# Patient Record
Sex: FEMALE | Race: WHITE | NOT HISPANIC OR LATINO
[De-identification: names, ages, dates, MRNs, and addresses within clinical notes are randomized per-mention and may not be internally consistent; named-entity substitution may affect disease eponyms.]

---

## 2017-01-27 ENCOUNTER — TRANSCRIPTION ENCOUNTER (OUTPATIENT)
Age: 57
End: 2017-01-27

## 2017-02-06 ENCOUNTER — APPOINTMENT (OUTPATIENT)
Dept: HEART AND VASCULAR | Facility: CLINIC | Age: 57
End: 2017-02-06

## 2017-03-28 ENCOUNTER — APPOINTMENT (OUTPATIENT)
Dept: OTOLARYNGOLOGY | Facility: CLINIC | Age: 57
End: 2017-03-28

## 2017-03-28 VITALS
OXYGEN SATURATION: 97 % | DIASTOLIC BLOOD PRESSURE: 86 MMHG | HEART RATE: 72 BPM | SYSTOLIC BLOOD PRESSURE: 152 MMHG | TEMPERATURE: 97.9 F

## 2017-03-28 VITALS — BODY MASS INDEX: 29.95 KG/M2 | WEIGHT: 180 LBS

## 2017-03-28 DIAGNOSIS — Z87.19 PERSONAL HISTORY OF OTHER DISEASES OF THE DIGESTIVE SYSTEM: ICD-10-CM

## 2017-08-10 ENCOUNTER — MESSAGE (OUTPATIENT)
Age: 57
End: 2017-08-10

## 2017-08-15 ENCOUNTER — APPOINTMENT (OUTPATIENT)
Dept: DERMATOLOGY | Facility: CLINIC | Age: 57
End: 2017-08-15
Payer: COMMERCIAL

## 2017-08-15 PROCEDURE — D0108R: CUSTOM

## 2017-08-15 PROCEDURE — D0088R: CUSTOM

## 2017-09-22 ENCOUNTER — MEDICATION RENEWAL (OUTPATIENT)
Age: 57
End: 2017-09-22

## 2018-01-04 ENCOUNTER — APPOINTMENT (OUTPATIENT)
Dept: HEART AND VASCULAR | Facility: CLINIC | Age: 58
End: 2018-01-04

## 2018-01-12 ENCOUNTER — APPOINTMENT (OUTPATIENT)
Dept: HEART AND VASCULAR | Facility: CLINIC | Age: 58
End: 2018-01-12
Payer: MEDICAID

## 2018-01-12 ENCOUNTER — LABORATORY RESULT (OUTPATIENT)
Age: 58
End: 2018-01-12

## 2018-01-12 VITALS
HEIGHT: 65 IN | SYSTOLIC BLOOD PRESSURE: 130 MMHG | WEIGHT: 162 LBS | TEMPERATURE: 97.9 F | RESPIRATION RATE: 14 BRPM | DIASTOLIC BLOOD PRESSURE: 86 MMHG | OXYGEN SATURATION: 98 % | HEART RATE: 97 BPM | BODY MASS INDEX: 26.99 KG/M2

## 2018-01-12 PROCEDURE — 93000 ELECTROCARDIOGRAM COMPLETE: CPT

## 2018-01-12 PROCEDURE — 36415 COLL VENOUS BLD VENIPUNCTURE: CPT

## 2018-01-12 PROCEDURE — 99396 PREV VISIT EST AGE 40-64: CPT | Mod: 25

## 2018-01-12 RX ORDER — FLUTICASONE PROPIONATE 50 UG/1
50 SPRAY, METERED NASAL
Qty: 1 | Refills: 2 | Status: DISCONTINUED | COMMUNITY
Start: 2017-03-28 | End: 2018-01-12

## 2018-01-16 LAB
ALBUMIN SERPL ELPH-MCNC: 4.5 G/DL
ALP BLD-CCNC: 59 U/L
ALT SERPL-CCNC: 12 U/L
ANION GAP SERPL CALC-SCNC: 17 MMOL/L
APPEARANCE: CLEAR
AST SERPL-CCNC: 14 U/L
BASOPHILS # BLD AUTO: 0.02 K/UL
BASOPHILS NFR BLD AUTO: 0.4 %
BILIRUB SERPL-MCNC: <0.2 MG/DL
BILIRUBIN URINE: NEGATIVE
BLOOD URINE: NEGATIVE
BUN SERPL-MCNC: 14 MG/DL
CALCIUM SERPL-MCNC: 9.5 MG/DL
CHLORIDE SERPL-SCNC: 102 MMOL/L
CHOLEST SERPL-MCNC: 230 MG/DL
CHOLEST/HDLC SERPL: 2.9 RATIO
CO2 SERPL-SCNC: 22 MMOL/L
COLOR: YELLOW
CREAT SERPL-MCNC: 0.78 MG/DL
CREAT SPEC-SCNC: 149 MG/DL
EOSINOPHIL # BLD AUTO: 0.11 K/UL
EOSINOPHIL NFR BLD AUTO: 1.9 %
GLUCOSE QUALITATIVE U: NEGATIVE MG/DL
GLUCOSE SERPL-MCNC: 97 MG/DL
HBA1C MFR BLD HPLC: 5.6 %
HCT VFR BLD CALC: 43.7 %
HDLC SERPL-MCNC: 78 MG/DL
HGB BLD-MCNC: 13.9 G/DL
IMM GRANULOCYTES NFR BLD AUTO: 0 %
KETONES URINE: NEGATIVE
LDLC SERPL CALC-MCNC: 119 MG/DL
LEUKOCYTE ESTERASE URINE: ABNORMAL
LYMPHOCYTES # BLD AUTO: 1.9 K/UL
LYMPHOCYTES NFR BLD AUTO: 33.3 %
MAN DIFF?: NORMAL
MCHC RBC-ENTMCNC: 30 PG
MCHC RBC-ENTMCNC: 31.8 GM/DL
MCV RBC AUTO: 94.4 FL
MICROALBUMIN 24H UR DL<=1MG/L-MCNC: 0.5 MG/DL
MICROALBUMIN/CREAT 24H UR-RTO: 3 MG/G
MONOCYTES # BLD AUTO: 0.26 K/UL
MONOCYTES NFR BLD AUTO: 4.6 %
NEUTROPHILS # BLD AUTO: 3.41 K/UL
NEUTROPHILS NFR BLD AUTO: 59.8 %
NITRITE URINE: NEGATIVE
PH URINE: 6
PLATELET # BLD AUTO: 245 K/UL
POTASSIUM SERPL-SCNC: 4.6 MMOL/L
PROT SERPL-MCNC: 7.3 G/DL
PROTEIN URINE: NEGATIVE MG/DL
RBC # BLD: 4.63 M/UL
RBC # FLD: 13.8 %
SODIUM SERPL-SCNC: 141 MMOL/L
SPECIFIC GRAVITY URINE: 1.02
TRIGL SERPL-MCNC: 164 MG/DL
TSH SERPL-ACNC: 0.52 UIU/ML
UROBILINOGEN URINE: NEGATIVE MG/DL
WBC # FLD AUTO: 5.7 K/UL

## 2018-02-08 ENCOUNTER — TRANSCRIPTION ENCOUNTER (OUTPATIENT)
Age: 58
End: 2018-02-08

## 2018-03-05 ENCOUNTER — MEDICATION RENEWAL (OUTPATIENT)
Age: 58
End: 2018-03-05

## 2018-07-13 ENCOUNTER — TRANSCRIPTION ENCOUNTER (OUTPATIENT)
Age: 58
End: 2018-07-13

## 2018-08-16 ENCOUNTER — OUTPATIENT (OUTPATIENT)
Dept: OUTPATIENT SERVICES | Facility: HOSPITAL | Age: 58
LOS: 1 days | End: 2018-08-16

## 2018-08-16 ENCOUNTER — APPOINTMENT (OUTPATIENT)
Dept: PLASTIC SURGERY | Facility: CLINIC | Age: 58
End: 2018-08-16

## 2018-08-16 VITALS
SYSTOLIC BLOOD PRESSURE: 132 MMHG | DIASTOLIC BLOOD PRESSURE: 78 MMHG | BODY MASS INDEX: 23.3 KG/M2 | HEART RATE: 85 BPM | RESPIRATION RATE: 14 BRPM | WEIGHT: 140 LBS | TEMPERATURE: 99.3 F

## 2018-08-16 DIAGNOSIS — Z78.9 OTHER SPECIFIED HEALTH STATUS: ICD-10-CM

## 2018-08-16 DIAGNOSIS — Z86.19 PERSONAL HISTORY OF OTHER INFECTIOUS AND PARASITIC DISEASES: ICD-10-CM

## 2018-08-17 DIAGNOSIS — Z01.89 ENCOUNTER FOR OTHER SPECIFIED SPECIAL EXAMINATIONS: ICD-10-CM

## 2018-11-01 ENCOUNTER — APPOINTMENT (OUTPATIENT)
Dept: PLASTIC SURGERY | Facility: CLINIC | Age: 58
End: 2018-11-01

## 2018-11-01 ENCOUNTER — OUTPATIENT (OUTPATIENT)
Dept: OUTPATIENT SERVICES | Facility: HOSPITAL | Age: 58
LOS: 1 days | End: 2018-11-01
Payer: COMMERCIAL

## 2018-11-01 ENCOUNTER — APPOINTMENT (OUTPATIENT)
Dept: ORTHOPEDIC SURGERY | Facility: CLINIC | Age: 58
End: 2018-11-01
Payer: MEDICAID

## 2018-11-01 VITALS
HEART RATE: 76 BPM | DIASTOLIC BLOOD PRESSURE: 80 MMHG | OXYGEN SATURATION: 98 % | WEIGHT: 140 LBS | HEIGHT: 65 IN | BODY MASS INDEX: 23.32 KG/M2 | SYSTOLIC BLOOD PRESSURE: 124 MMHG

## 2018-11-01 PROCEDURE — 73130 X-RAY EXAM OF HAND: CPT | Mod: 26,50

## 2018-11-01 PROCEDURE — 99213 OFFICE O/P EST LOW 20 MIN: CPT

## 2018-11-01 PROCEDURE — 73130 X-RAY EXAM OF HAND: CPT

## 2019-01-11 ENCOUNTER — LABORATORY RESULT (OUTPATIENT)
Age: 59
End: 2019-01-11

## 2019-01-11 ENCOUNTER — APPOINTMENT (OUTPATIENT)
Dept: HEART AND VASCULAR | Facility: CLINIC | Age: 59
End: 2019-01-11
Payer: MEDICAID

## 2019-01-11 VITALS
HEIGHT: 65 IN | RESPIRATION RATE: 14 BRPM | OXYGEN SATURATION: 97 % | DIASTOLIC BLOOD PRESSURE: 85 MMHG | HEART RATE: 80 BPM | TEMPERATURE: 97.7 F | WEIGHT: 145 LBS | BODY MASS INDEX: 24.16 KG/M2 | SYSTOLIC BLOOD PRESSURE: 120 MMHG

## 2019-01-11 DIAGNOSIS — Z01.419 ENCOUNTER FOR GYNECOLOGICAL EXAMINATION (GENERAL) (ROUTINE) W/OUT ABNORMAL FINDINGS: ICD-10-CM

## 2019-01-11 PROCEDURE — 93000 ELECTROCARDIOGRAM COMPLETE: CPT

## 2019-01-11 PROCEDURE — 36415 COLL VENOUS BLD VENIPUNCTURE: CPT

## 2019-01-11 PROCEDURE — 99214 OFFICE O/P EST MOD 30 MIN: CPT

## 2019-01-11 NOTE — PHYSICAL EXAM
[General Appearance - Well Developed] : well developed [Normal Appearance] : normal appearance [Well Groomed] : well groomed [General Appearance - Well Nourished] : well nourished [No Deformities] : no deformities [General Appearance - In No Acute Distress] : no acute distress [Normal Conjunctiva] : the conjunctiva exhibited no abnormalities [Eyelids - No Xanthelasma] : the eyelids demonstrated no xanthelasmas [Normal Oral Mucosa] : normal oral mucosa [No Oral Pallor] : no oral pallor [No Oral Cyanosis] : no oral cyanosis [Normal Jugular Venous A Waves Present] : normal jugular venous A waves present [Normal Jugular Venous V Waves Present] : normal jugular venous V waves present [No Jugular Venous Moreno A Waves] : no jugular venous moreno A waves [Respiration, Rhythm And Depth] : normal respiratory rhythm and effort [Exaggerated Use Of Accessory Muscles For Inspiration] : no accessory muscle use [Auscultation Breath Sounds / Voice Sounds] : lungs were clear to auscultation bilaterally [Heart Rate And Rhythm] : heart rate and rhythm were normal [Heart Sounds] : normal S1 and S2 [Murmurs] : no murmurs present [Abdomen Soft] : soft [Abdomen Tenderness] : non-tender [Abdomen Mass (___ Cm)] : no abdominal mass palpated [Abnormal Walk] : normal gait [Gait - Sufficient For Exercise Testing] : the gait was sufficient for exercise testing [Nail Clubbing] : no clubbing of the fingernails [Cyanosis, Localized] : no localized cyanosis [Petechial Hemorrhages (___cm)] : no petechial hemorrhages [] : no ischemic changes [FreeTextEntry1] : sm all vesicular 0.5 cm lesion on erythematous base on 5th digit of left hand [Oriented To Time, Place, And Person] : oriented to person, place, and time [Affect] : the affect was normal [Mood] : the mood was normal [No Anxiety] : not feeling anxious

## 2019-01-11 NOTE — HISTORY OF PRESENT ILLNESS
[FreeTextEntry1] : feels well\par lost wt via diet and exercise\par discussed cancer screening, vaccines and rfm\par no new comps

## 2019-01-11 NOTE — DISCUSSION/SUMMARY
[___ Month(s)] : [unfilled] month(s) [With Me] : with me [FreeTextEntry1] : already got fluvax\par stable exam

## 2019-01-14 LAB
ALBUMIN SERPL ELPH-MCNC: 4.4 G/DL
ALP BLD-CCNC: 49 U/L
ALT SERPL-CCNC: 8 U/L
ANION GAP SERPL CALC-SCNC: 11 MMOL/L
APPEARANCE: CLEAR
AST SERPL-CCNC: 14 U/L
BASOPHILS # BLD AUTO: 0.01 K/UL
BASOPHILS NFR BLD AUTO: 0.2 %
BILIRUB SERPL-MCNC: 0.3 MG/DL
BILIRUBIN URINE: NEGATIVE
BLOOD URINE: ABNORMAL
BUN SERPL-MCNC: 14 MG/DL
CALCIUM SERPL-MCNC: 9.1 MG/DL
CHLORIDE SERPL-SCNC: 105 MMOL/L
CHOLEST SERPL-MCNC: 226 MG/DL
CHOLEST/HDLC SERPL: 2.2 RATIO
CO2 SERPL-SCNC: 23 MMOL/L
COLOR: YELLOW
CREAT SERPL-MCNC: 0.77 MG/DL
CREAT SPEC-SCNC: 40 MG/DL
EOSINOPHIL # BLD AUTO: 0.09 K/UL
EOSINOPHIL NFR BLD AUTO: 1.7 %
GLUCOSE QUALITATIVE U: NEGATIVE MG/DL
GLUCOSE SERPL-MCNC: 101 MG/DL
HBA1C MFR BLD HPLC: 5.4 %
HCT VFR BLD CALC: 41.5 %
HDLC SERPL-MCNC: 104 MG/DL
HGB BLD-MCNC: 13.6 G/DL
IMM GRANULOCYTES NFR BLD AUTO: 0.2 %
KETONES URINE: NEGATIVE
LDLC SERPL CALC-MCNC: 104 MG/DL
LEUKOCYTE ESTERASE URINE: ABNORMAL
LYMPHOCYTES # BLD AUTO: 2.07 K/UL
LYMPHOCYTES NFR BLD AUTO: 40.1 %
MAN DIFF?: NORMAL
MCHC RBC-ENTMCNC: 30.1 PG
MCHC RBC-ENTMCNC: 32.8 GM/DL
MCV RBC AUTO: 91.8 FL
MICROALBUMIN 24H UR DL<=1MG/L-MCNC: <1.2 MG/DL
MICROALBUMIN/CREAT 24H UR-RTO: NORMAL
MONOCYTES # BLD AUTO: 0.29 K/UL
MONOCYTES NFR BLD AUTO: 5.6 %
NEUTROPHILS # BLD AUTO: 2.69 K/UL
NEUTROPHILS NFR BLD AUTO: 52.2 %
NITRITE URINE: NEGATIVE
PH URINE: 6.5
PLATELET # BLD AUTO: 211 K/UL
POTASSIUM SERPL-SCNC: 4.7 MMOL/L
PROT SERPL-MCNC: 6.8 G/DL
PROTEIN URINE: NEGATIVE MG/DL
RBC # BLD: 4.52 M/UL
RBC # FLD: 13.6 %
SODIUM SERPL-SCNC: 139 MMOL/L
SPECIFIC GRAVITY URINE: 1.01
TRIGL SERPL-MCNC: 89 MG/DL
TSH SERPL-ACNC: 0.81 UIU/ML
UROBILINOGEN URINE: NEGATIVE MG/DL
WBC # FLD AUTO: 5.16 K/UL

## 2019-04-02 ENCOUNTER — APPOINTMENT (OUTPATIENT)
Dept: HEART AND VASCULAR | Facility: CLINIC | Age: 59
End: 2019-04-02
Payer: MEDICAID

## 2019-04-02 VITALS
OXYGEN SATURATION: 98 % | DIASTOLIC BLOOD PRESSURE: 80 MMHG | HEART RATE: 70 BPM | SYSTOLIC BLOOD PRESSURE: 140 MMHG | WEIGHT: 145 LBS | HEIGHT: 65 IN | BODY MASS INDEX: 24.16 KG/M2 | RESPIRATION RATE: 14 BRPM

## 2019-04-02 PROCEDURE — 99214 OFFICE O/P EST MOD 30 MIN: CPT

## 2019-04-02 NOTE — PHYSICAL EXAM
[General Appearance - Well Developed] : well developed [Normal Appearance] : normal appearance [Well Groomed] : well groomed [General Appearance - Well Nourished] : well nourished [No Deformities] : no deformities [General Appearance - In No Acute Distress] : no acute distress [Normal Conjunctiva] : the conjunctiva exhibited no abnormalities [Eyelids - No Xanthelasma] : the eyelids demonstrated no xanthelasmas [Normal Oral Mucosa] : normal oral mucosa [No Oral Pallor] : no oral pallor [No Oral Cyanosis] : no oral cyanosis [Normal Jugular Venous A Waves Present] : normal jugular venous A waves present [Normal Jugular Venous V Waves Present] : normal jugular venous V waves present [No Jugular Venous Moreno A Waves] : no jugular venous moreno A waves [Respiration, Rhythm And Depth] : normal respiratory rhythm and effort [Exaggerated Use Of Accessory Muscles For Inspiration] : no accessory muscle use [Auscultation Breath Sounds / Voice Sounds] : lungs were clear to auscultation bilaterally [Heart Rate And Rhythm] : heart rate and rhythm were normal [Heart Sounds] : normal S1 and S2 [Murmurs] : no murmurs present [Abdomen Soft] : soft [Abdomen Tenderness] : non-tender [Abdomen Mass (___ Cm)] : no abdominal mass palpated [Abnormal Walk] : normal gait [Gait - Sufficient For Exercise Testing] : the gait was sufficient for exercise testing [Nail Clubbing] : no clubbing of the fingernails [Cyanosis, Localized] : no localized cyanosis [Petechial Hemorrhages (___cm)] : no petechial hemorrhages [] : no ischemic changes [Oriented To Time, Place, And Person] : oriented to person, place, and time [Affect] : the affect was normal [Mood] : the mood was normal [No Anxiety] : not feeling anxious [FreeTextEntry1] : sm all vesicular 0.5 cm lesion on erythematous base on 5th digit of left hand

## 2019-04-02 NOTE — DISCUSSION/SUMMARY
[FreeTextEntry1] : BPV- referred ENT\par so treatment needed at this point as sx are resolving and are mild

## 2019-04-02 NOTE — HISTORY OF PRESENT ILLNESS
[FreeTextEntry1] : vertigo\par positional, mild for the last week\par no focal neuro sx or HA\par no ear sx or recent uri\par improving but not resolved

## 2019-04-03 ENCOUNTER — TRANSCRIPTION ENCOUNTER (OUTPATIENT)
Age: 59
End: 2019-04-03

## 2019-10-21 ENCOUNTER — APPOINTMENT (OUTPATIENT)
Dept: DERMATOLOGY | Facility: CLINIC | Age: 59
End: 2019-10-21
Payer: MEDICAID

## 2019-10-21 VITALS — SYSTOLIC BLOOD PRESSURE: 135 MMHG | DIASTOLIC BLOOD PRESSURE: 73 MMHG

## 2019-10-21 PROCEDURE — 99213 OFFICE O/P EST LOW 20 MIN: CPT

## 2019-10-21 PROCEDURE — D0051: CPT

## 2020-01-14 ENCOUNTER — APPOINTMENT (OUTPATIENT)
Dept: OPHTHALMOLOGY | Facility: CLINIC | Age: 60
End: 2020-01-14
Payer: MEDICAID

## 2020-01-14 ENCOUNTER — NON-APPOINTMENT (OUTPATIENT)
Age: 60
End: 2020-01-14

## 2020-01-14 PROCEDURE — 92134 CPTRZ OPH DX IMG PST SGM RTA: CPT

## 2020-01-14 PROCEDURE — 92004 COMPRE OPH EXAM NEW PT 1/>: CPT

## 2020-02-26 ENCOUNTER — TRANSCRIPTION ENCOUNTER (OUTPATIENT)
Age: 60
End: 2020-02-26

## 2020-04-14 ENCOUNTER — APPOINTMENT (OUTPATIENT)
Dept: HEART AND VASCULAR | Facility: CLINIC | Age: 60
End: 2020-04-14
Payer: MEDICAID

## 2020-04-14 PROCEDURE — 99442: CPT

## 2020-05-06 ENCOUNTER — NON-APPOINTMENT (OUTPATIENT)
Age: 60
End: 2020-05-06

## 2020-05-06 ENCOUNTER — APPOINTMENT (OUTPATIENT)
Dept: HEART AND VASCULAR | Facility: CLINIC | Age: 60
End: 2020-05-06
Payer: MEDICAID

## 2020-05-06 VITALS
DIASTOLIC BLOOD PRESSURE: 110 MMHG | HEIGHT: 65 IN | OXYGEN SATURATION: 97 % | HEART RATE: 89 BPM | SYSTOLIC BLOOD PRESSURE: 179 MMHG | WEIGHT: 182 LBS | BODY MASS INDEX: 30.32 KG/M2

## 2020-05-06 VITALS — SYSTOLIC BLOOD PRESSURE: 175 MMHG | DIASTOLIC BLOOD PRESSURE: 94 MMHG

## 2020-05-06 DIAGNOSIS — R20.0 ANESTHESIA OF SKIN: ICD-10-CM

## 2020-05-06 DIAGNOSIS — L57.8 OTHER SKIN CHANGES DUE TO CHRONIC EXPOSURE TO NONIONIZING RADIATION: ICD-10-CM

## 2020-05-06 DIAGNOSIS — Z92.89 PERSONAL HISTORY OF OTHER MEDICAL TREATMENT: ICD-10-CM

## 2020-05-06 DIAGNOSIS — R04.0 EPISTAXIS: ICD-10-CM

## 2020-05-06 DIAGNOSIS — R03.0 ELEVATED BLOOD-PRESSURE READING, W/OUT DIAGNOSIS OF HYPERTENSION: ICD-10-CM

## 2020-05-06 DIAGNOSIS — E66.9 OBESITY, UNSPECIFIED: ICD-10-CM

## 2020-05-06 DIAGNOSIS — Z01.818 ENCOUNTER FOR OTHER PREPROCEDURAL EXAMINATION: ICD-10-CM

## 2020-05-06 DIAGNOSIS — M54.16 RADICULOPATHY, LUMBAR REGION: ICD-10-CM

## 2020-05-06 PROCEDURE — 93000 ELECTROCARDIOGRAM COMPLETE: CPT

## 2020-05-06 PROCEDURE — 99204 OFFICE O/P NEW MOD 45 MIN: CPT | Mod: 25

## 2020-05-06 PROCEDURE — 99214 OFFICE O/P EST MOD 30 MIN: CPT

## 2020-05-06 NOTE — PHYSICAL EXAM
[General Appearance - Well Developed] : well developed [Normal Appearance] : normal appearance [Well Groomed] : well groomed [General Appearance - Well Nourished] : well nourished [No Deformities] : no deformities [General Appearance - In No Acute Distress] : no acute distress [Normal Conjunctiva] : the conjunctiva exhibited no abnormalities [Eyelids - No Xanthelasma] : the eyelids demonstrated no xanthelasmas [Normal Oral Mucosa] : normal oral mucosa [No Oral Pallor] : no oral pallor [No Oral Cyanosis] : no oral cyanosis [Normal Jugular Venous A Waves Present] : normal jugular venous A waves present [Normal Jugular Venous V Waves Present] : normal jugular venous V waves present [No Jugular Venous Moreno A Waves] : no jugular venous moreno A waves [Normal Rate] : normal [Normal S1] : normal S1 [Normal S2] : normal S2 [No Murmur] : no murmurs heard [2+] : left 2+ [No Abnormalities] : the abdominal aorta was not enlarged and no bruit was heard [No Pitting Edema] : no pitting edema present [Respiration, Rhythm And Depth] : normal respiratory rhythm and effort [Exaggerated Use Of Accessory Muscles For Inspiration] : no accessory muscle use [Auscultation Breath Sounds / Voice Sounds] : lungs were clear to auscultation bilaterally [Abdomen Soft] : soft [Abdomen Tenderness] : non-tender [Abdomen Mass (___ Cm)] : no abdominal mass palpated [Abnormal Walk] : normal gait [Gait - Sufficient For Exercise Testing] : the gait was sufficient for exercise testing [Nail Clubbing] : no clubbing of the fingernails [Cyanosis, Localized] : no localized cyanosis [Petechial Hemorrhages (___cm)] : no petechial hemorrhages [Skin Color & Pigmentation] : normal skin color and pigmentation [] : no rash [No Venous Stasis] : no venous stasis [Skin Lesions] : no skin lesions [No Skin Ulcers] : no skin ulcer [No Xanthoma] : no  xanthoma was observed [Oriented To Time, Place, And Person] : oriented to person, place, and time [Affect] : the affect was normal [Mood] : the mood was normal [No Anxiety] : not feeling anxious [FreeTextEntry1] : + overweight [S3] : no S3 [S4] : no S4 [Right Carotid Bruit] : no bruit heard over the right carotid [Left Carotid Bruit] : no bruit heard over the left carotid [Right Femoral Bruit] : no bruit heard over the right femoral artery [Left Femoral Bruit] : no bruit heard over the left femoral artery

## 2020-05-06 NOTE — ASSESSMENT
[FreeTextEntry1] : 1. HTN: BP not at ACC/AHA 2017 guideline target\par       - will initiate amlodipine 5mg po QD and increase to 10mg po QD after 1 week if SBP remains > 130 (possible adverse effects of new medications discussed)\par      - she will maintain an ambulatory BP log for my review\par      - will check lab work today to screen for secondary etiologies of HTN\par      - will send for an echocardiogram to r/o hypertensive heart disease\par \par 2. Overweight: BMI 30\par      - we had an extensive discussion about therapeutic lifestyle changes to promote increased cardiovascular fitness and achieving goal weight.\par \par 3. r/o ANUM:\par      - will order a home sleep study\par \par \par

## 2020-05-06 NOTE — REASON FOR VISIT
[Initial Evaluation] : an initial evaluation of [FreeTextEntry1] : Diagnostic Tests:\par ------------------------------------\par ECG:\par 05/06/20: NSR, CLIVE, PRWP. \par 01/11/19: NSR, CLIVE. \par 01/16/18: NSR, CLIVE. \par 05/19/16: NSR, CLIVE. \par 02/19/16: NSR, CLIVE.

## 2020-05-06 NOTE — HISTORY OF PRESENT ILLNESS
[FreeTextEntry1] : Ms. Barron presents for initial evaluation and management of HTN and overweight.  She was followed by another cardiologist, Oscar Rogers MD for a number of years but would like to establish care with a new cardiologist.  She has a history of HTN and was prescribed amlodipine 5mg but has not initiated it.  She has been overweight in the past and had engaged in a period of increased exercise and dietary modifications but has regained much of the weight during the COVID crisis.  We had an extensive discussion about therapeutic lifestyle changes to promote increased cardiovascular fitness and achieving goal weight.  She denies chest pain, palpitations, or lower extremity edema.  She has RODRIGUEZ to several flights of stairs.  \par

## 2020-05-07 LAB
ALBUMIN SERPL ELPH-MCNC: 4.7 G/DL
ALDOSTERONE SERUM: 7 NG/DL
ALP BLD-CCNC: 63 U/L
ALT SERPL-CCNC: 13 U/L
ANION GAP SERPL CALC-SCNC: 15 MMOL/L
AST SERPL-CCNC: 18 U/L
BASOPHILS # BLD AUTO: 0.03 K/UL
BASOPHILS NFR BLD AUTO: 0.5 %
BILIRUB SERPL-MCNC: 0.3 MG/DL
BUN SERPL-MCNC: 13 MG/DL
CALCIUM SERPL-MCNC: 9.7 MG/DL
CHLORIDE SERPL-SCNC: 102 MMOL/L
CHOLEST SERPL-MCNC: 232 MG/DL
CHOLEST/HDLC SERPL: 2.4 RATIO
CO2 SERPL-SCNC: 23 MMOL/L
CREAT SERPL-MCNC: 0.68 MG/DL
EOSINOPHIL # BLD AUTO: 0.12 K/UL
EOSINOPHIL NFR BLD AUTO: 2.1 %
GLUCOSE SERPL-MCNC: 103 MG/DL
HCT VFR BLD CALC: 43.6 %
HDLC SERPL-MCNC: 98 MG/DL
HGB BLD-MCNC: 14.5 G/DL
IMM GRANULOCYTES NFR BLD AUTO: 0.2 %
LDLC SERPL CALC-MCNC: 113 MG/DL
LDLC SERPL DIRECT ASSAY-MCNC: 125 MG/DL
LYMPHOCYTES # BLD AUTO: 1.85 K/UL
LYMPHOCYTES NFR BLD AUTO: 32.5 %
MAN DIFF?: NORMAL
MCHC RBC-ENTMCNC: 30.4 PG
MCHC RBC-ENTMCNC: 33.3 GM/DL
MCV RBC AUTO: 91.4 FL
MONOCYTES # BLD AUTO: 0.34 K/UL
MONOCYTES NFR BLD AUTO: 6 %
NEUTROPHILS # BLD AUTO: 3.35 K/UL
NEUTROPHILS NFR BLD AUTO: 58.7 %
PLATELET # BLD AUTO: 230 K/UL
POTASSIUM SERPL-SCNC: 4.3 MMOL/L
PROT SERPL-MCNC: 7.5 G/DL
RBC # BLD: 4.77 M/UL
RBC # FLD: 12.7 %
SODIUM SERPL-SCNC: 140 MMOL/L
TRIGL SERPL-MCNC: 107 MG/DL
TSH SERPL-ACNC: 0.85 UIU/ML
WBC # FLD AUTO: 5.7 K/UL

## 2020-05-11 LAB — RENIN ACTIVITY, PLASMA: 0.24 NG/ML/HR

## 2020-05-13 ENCOUNTER — APPOINTMENT (OUTPATIENT)
Dept: INTERNAL MEDICINE | Facility: CLINIC | Age: 60
End: 2020-05-13
Payer: MEDICAID

## 2020-05-13 VITALS
WEIGHT: 180 LBS | DIASTOLIC BLOOD PRESSURE: 90 MMHG | HEART RATE: 90 BPM | SYSTOLIC BLOOD PRESSURE: 153 MMHG | HEIGHT: 65 IN | BODY MASS INDEX: 29.99 KG/M2 | TEMPERATURE: 97.6 F | OXYGEN SATURATION: 97 %

## 2020-05-13 DIAGNOSIS — Z82.49 FAMILY HISTORY OF ISCHEMIC HEART DISEASE AND OTHER DISEASES OF THE CIRCULATORY SYSTEM: ICD-10-CM

## 2020-05-13 DIAGNOSIS — Z72.89 OTHER PROBLEMS RELATED TO LIFESTYLE: ICD-10-CM

## 2020-05-13 DIAGNOSIS — Z79.890 HORMONE REPLACEMENT THERAPY: ICD-10-CM

## 2020-05-13 LAB
CORTICOSTEROID BIND GLOBULIN: 2.9 MG/DL
CORTIS SERPL-MCNC: 13 UG/DL
CORTISOL, FREE: 0.59 UG/DL
PFCX: 4.6 %

## 2020-05-13 PROCEDURE — 99204 OFFICE O/P NEW MOD 45 MIN: CPT

## 2020-05-13 RX ORDER — TRETINOIN 1 MG/G
0.1 CREAM TOPICAL
Qty: 1 | Refills: 4 | Status: DISCONTINUED | COMMUNITY
Start: 2017-08-15 | End: 2020-05-13

## 2020-05-14 ENCOUNTER — RX CHANGE (OUTPATIENT)
Age: 60
End: 2020-05-14

## 2020-05-14 LAB
CREAT SPEC-SCNC: 105 MG/DL
MICROALBUMIN 24H UR DL<=1MG/L-MCNC: <1.2 MG/DL
MICROALBUMIN/CREAT 24H UR-RTO: NORMAL MG/G

## 2020-05-14 RX ORDER — TRETINOIN 0.5 MG/G
0.05 CREAM TOPICAL
Qty: 20 | Refills: 0 | Status: DISCONTINUED | COMMUNITY
Start: 2019-10-21 | End: 2020-05-14

## 2020-05-15 LAB
METANEPHRINE, PL: 27 PG/ML
NORMETANEPHRINE, PL: 193 PG/ML

## 2020-05-26 ENCOUNTER — APPOINTMENT (OUTPATIENT)
Dept: PULMONOLOGY | Facility: CLINIC | Age: 60
End: 2020-05-26
Payer: MEDICAID

## 2020-05-26 VITALS
HEIGHT: 65 IN | SYSTOLIC BLOOD PRESSURE: 151 MMHG | OXYGEN SATURATION: 97 % | BODY MASS INDEX: 29.99 KG/M2 | HEART RATE: 79 BPM | DIASTOLIC BLOOD PRESSURE: 84 MMHG | WEIGHT: 180 LBS | TEMPERATURE: 98.6 F

## 2020-05-26 DIAGNOSIS — Z82.3 FAMILY HISTORY OF STROKE: ICD-10-CM

## 2020-05-26 DIAGNOSIS — Z82.5 FAMILY HISTORY OF ASTHMA AND OTHER CHRONIC LOWER RESPIRATORY DISEASES: ICD-10-CM

## 2020-05-26 PROCEDURE — 99204 OFFICE O/P NEW MOD 45 MIN: CPT

## 2020-05-26 NOTE — ASSESSMENT
[FreeTextEntry1] : severe snoring, recent weight gain, and hypertension.  obstructive sleep apnea is possible, although does not report significant excessive daytime somnolence.  had been drinking more alcohol (wine) recently, discontinued.  told her wine may make snoring and obstructive sleep apnea worse.\par \par Based on history and physical exam, sleep disordered breathing is at least moderately likely.  The patient was advised to have overnight unattended home sleep testing , and  will discuss results after testing. \par \par \par

## 2020-05-26 NOTE — REVIEW OF SYSTEMS
[EDS: ESS=____] : daytime somnolence: ESS=[unfilled] [Fatigue] : no fatigue [Recent Wt Gain (___ Lbs)] : recent [unfilled] ~Ulb weight gain [Nasal Congestion] : nasal congestion [Snoring] : snoring [Witnessed Apneas] : no witnessed apnea [Shortness Of Breath] : no shortness of breath [Negative] : Psychiatric

## 2020-05-26 NOTE — PHYSICAL EXAM
[General Appearance - Well Developed] : well developed [Normal Appearance] : normal appearance [Well Groomed] : well groomed [General Appearance - Well Nourished] : well nourished [General Appearance - In No Acute Distress] : no acute distress [No Deformities] : no deformities [Normal Conjunctiva] : the conjunctiva exhibited no abnormalities [Eyelids - No Xanthelasma] : the eyelids demonstrated no xanthelasmas [II] : II [Normal Oropharynx] : normal oropharynx [Heart Rate And Rhythm] : heart rate was normal and rhythm regular [Heart Sounds] : normal S1 and S2 [Murmurs] : no murmurs [Heart Sounds Gallop] : no gallops [Heart Sounds Pericardial Friction Rub] : no pericardial rub [Auscultation Breath Sounds / Voice Sounds] : lungs were clear to auscultation bilaterally [Abnormal Walk] : normal gait [Motor Tone] : muscle strength and tone were normal [Musculoskeletal - Swelling] : no joint swelling seen [Nail Clubbing] : no clubbing of the fingernails [Cyanosis, Localized] : no localized cyanosis [Petechial Hemorrhages (___cm)] : no petechial hemorrhages [Skin Color & Pigmentation] : normal skin color and pigmentation [Skin Turgor] : normal skin turgor [] : no rash [Sensation] : the sensory exam was normal to light touch and pinprick [Deep Tendon Reflexes (DTR)] : deep tendon reflexes were 2+ and symmetric [No Focal Deficits] : no focal deficits

## 2020-05-26 NOTE — HISTORY OF PRESENT ILLNESS
[FreeTextEntry1] : 05/26/2020 :  ZAINAB CAMARILLO is a 60 year female who is here for Concern about possible sleep apnea. She has recently gained a considerable amount of weight, mostly due to being quarantined during the pandemic, and has developed hypertension. She has been told of severe snoring, apnea has not been observed. She is currently sleeping between about 9 PM and 6 AM with 3 or 4 brief awakenings. Her schedule is more regular now as she is not working, she had been working as a corporate  with irregular hours. She does not report significant daytime sleepiness, with an [place] sleepiness score of 2/24. She will occasionally awaken with a headache, and often awakens with nasal or sinus congestion. She generally feels well rested in the morning. There is no history of parasomnia.\par \par

## 2020-06-03 ENCOUNTER — FORM ENCOUNTER (OUTPATIENT)
Age: 60
End: 2020-06-03

## 2020-06-04 ENCOUNTER — OUTPATIENT (OUTPATIENT)
Dept: OUTPATIENT SERVICES | Facility: HOSPITAL | Age: 60
LOS: 1 days | End: 2020-06-04
Payer: MEDICAID

## 2020-06-04 DIAGNOSIS — I10 ESSENTIAL (PRIMARY) HYPERTENSION: ICD-10-CM

## 2020-06-04 PROCEDURE — 93306 TTE W/DOPPLER COMPLETE: CPT

## 2020-06-04 PROCEDURE — 93306 TTE W/DOPPLER COMPLETE: CPT | Mod: 26

## 2020-06-06 ENCOUNTER — APPOINTMENT (OUTPATIENT)
Dept: SLEEP CENTER | Facility: HOME HEALTH | Age: 60
End: 2020-06-06
Payer: MEDICAID

## 2020-06-06 PROCEDURE — 95800 SLP STDY UNATTENDED: CPT | Mod: 26

## 2020-06-10 ENCOUNTER — APPOINTMENT (OUTPATIENT)
Dept: INTERNAL MEDICINE | Facility: CLINIC | Age: 60
End: 2020-06-10

## 2020-06-17 ENCOUNTER — FORM ENCOUNTER (OUTPATIENT)
Age: 60
End: 2020-06-17

## 2020-07-07 ENCOUNTER — APPOINTMENT (OUTPATIENT)
Dept: OPHTHALMOLOGY | Facility: CLINIC | Age: 60
End: 2020-07-07

## 2020-07-14 PROBLEM — I10 BENIGN ESSENTIAL HTN: Status: RESOLVED | Noted: 2020-04-14 | Resolved: 2020-07-14

## 2020-07-15 ENCOUNTER — APPOINTMENT (OUTPATIENT)
Dept: HEART AND VASCULAR | Facility: CLINIC | Age: 60
End: 2020-07-15
Payer: MEDICAID

## 2020-07-15 DIAGNOSIS — I10 ESSENTIAL (PRIMARY) HYPERTENSION: ICD-10-CM

## 2020-07-29 ENCOUNTER — APPOINTMENT (OUTPATIENT)
Dept: HEART AND VASCULAR | Facility: CLINIC | Age: 60
End: 2020-07-29
Payer: MEDICAID

## 2020-08-05 ENCOUNTER — APPOINTMENT (OUTPATIENT)
Dept: HEART AND VASCULAR | Facility: CLINIC | Age: 60
End: 2020-08-05
Payer: MEDICAID

## 2020-08-05 VITALS
OXYGEN SATURATION: 96 % | DIASTOLIC BLOOD PRESSURE: 98 MMHG | HEIGHT: 65 IN | HEART RATE: 82 BPM | WEIGHT: 180 LBS | SYSTOLIC BLOOD PRESSURE: 150 MMHG | BODY MASS INDEX: 29.99 KG/M2

## 2020-08-05 VITALS — DIASTOLIC BLOOD PRESSURE: 75 MMHG | SYSTOLIC BLOOD PRESSURE: 130 MMHG

## 2020-08-05 DIAGNOSIS — Z86.69 PERSONAL HISTORY OF OTHER DISEASES OF THE NERVOUS SYSTEM AND SENSE ORGANS: ICD-10-CM

## 2020-08-05 PROCEDURE — 99213 OFFICE O/P EST LOW 20 MIN: CPT

## 2020-08-05 NOTE — ASSESSMENT
[FreeTextEntry1] : 1. HTN: BP at ACC/AHA 2017 guideline target, not tolerant of amlodipine secondary to dizziness\par       - her ambulatory BP readings have mostly been optimal \par       - continue off anti-HTN medications at this time\par \par 2. Overweight: BMI 30\par      - we had an extensive discussion about therapeutic lifestyle changes to promote increased cardiovascular fitness and achieving goal weight.\par \par \par \par

## 2020-08-05 NOTE — HISTORY OF PRESENT ILLNESS
[FreeTextEntry1] : Ms. Barron presents for follow up and management of HTN and overweight.  She was followed by another cardiologist, Oscar Rogers MD for a number of years but would like to establish care with a new cardiologist.  She has a history of HTN and was prescribed amlodipine 5mg but has not initiated it.  She has been overweight in the past and had engaged in a period of increased exercise and dietary modifications but has regained much of the weight during the COVID crisis.  We had an extensive discussion about therapeutic lifestyle changes to promote increased cardiovascular fitness and achieving goal weight.  She had an echocardiogram on 06/04/20 which revealed an EF of 60% and was a normal study.  She denies chest pain, palpitations, or lower extremity edema.  She has modified her diet to improve overall health and has lost weight as a result.  She was not able to tolerate amlodipine secondary to dizziness.  \par

## 2020-08-05 NOTE — PHYSICAL EXAM
[FreeTextEntry1] : + overweight [S3] : no S3 [S4] : no S4 [Left Carotid Bruit] : no bruit heard over the left carotid [Right Carotid Bruit] : no bruit heard over the right carotid [Right Femoral Bruit] : no bruit heard over the right femoral artery [Left Femoral Bruit] : no bruit heard over the left femoral artery

## 2020-10-20 ENCOUNTER — RX RENEWAL (OUTPATIENT)
Age: 60
End: 2020-10-20

## 2020-10-21 ENCOUNTER — APPOINTMENT (OUTPATIENT)
Dept: DERMATOLOGY | Facility: CLINIC | Age: 60
End: 2020-10-21

## 2020-12-02 ENCOUNTER — APPOINTMENT (OUTPATIENT)
Dept: HEART AND VASCULAR | Facility: CLINIC | Age: 60
End: 2020-12-02

## 2021-02-18 ENCOUNTER — APPOINTMENT (OUTPATIENT)
Dept: OBGYN | Facility: CLINIC | Age: 61
End: 2021-02-18
Payer: MEDICAID

## 2021-02-18 VITALS
BODY MASS INDEX: 29.32 KG/M2 | HEIGHT: 65 IN | SYSTOLIC BLOOD PRESSURE: 125 MMHG | DIASTOLIC BLOOD PRESSURE: 80 MMHG | WEIGHT: 176 LBS

## 2021-02-18 PROCEDURE — 99072 ADDL SUPL MATRL&STAF TM PHE: CPT

## 2021-02-18 PROCEDURE — 99213 OFFICE O/P EST LOW 20 MIN: CPT | Mod: 25

## 2021-02-18 PROCEDURE — 99386 PREV VISIT NEW AGE 40-64: CPT

## 2021-02-18 NOTE — COUNSELING
[Nutrition/ Exercise/ Weight Management] : nutrition, exercise, weight management [Vitamins/Supplements] : vitamins/supplements [Drugs/Alcohol] : drugs, alcohol [Other ___] : [unfilled]

## 2021-02-18 NOTE — HISTORY OF PRESENT ILLNESS
[postmenopausal] : postmenopausal [N] : Patient denies prior pregnancies [No] : Patient does not have concerns regarding sex [Previously active] : previously active [FreeTextEntry1] : 62 yo presents for initial gynecological visit, she reports headache secondary to quitting caffeine and drinking wine with her friends last night.  Not sexually active. She is on Prempro and tried to stop but was symptomatic.  Also requesting refill for retinA due to acne.  [TextBox_4] : Patient is here for her annual exam  [Mammogramdate] : 9/19 [PapSmeardate] : 9/20 [ColonoscopyDate] : 2015

## 2021-02-19 ENCOUNTER — APPOINTMENT (OUTPATIENT)
Dept: MAMMOGRAPHY | Facility: CLINIC | Age: 61
End: 2021-02-19
Payer: MEDICAID

## 2021-02-19 ENCOUNTER — OUTPATIENT (OUTPATIENT)
Dept: OUTPATIENT SERVICES | Facility: HOSPITAL | Age: 61
LOS: 1 days | End: 2021-02-19

## 2021-02-19 ENCOUNTER — RESULT REVIEW (OUTPATIENT)
Age: 61
End: 2021-02-19

## 2021-02-19 ENCOUNTER — APPOINTMENT (OUTPATIENT)
Dept: ULTRASOUND IMAGING | Facility: CLINIC | Age: 61
End: 2021-02-19
Payer: MEDICAID

## 2021-02-19 PROCEDURE — 76856 US EXAM PELVIC COMPLETE: CPT | Mod: 26

## 2021-02-19 PROCEDURE — 76830 TRANSVAGINAL US NON-OB: CPT | Mod: 26

## 2021-02-19 PROCEDURE — 77067 SCR MAMMO BI INCL CAD: CPT | Mod: 26

## 2021-02-19 PROCEDURE — 77063 BREAST TOMOSYNTHESIS BI: CPT | Mod: 26

## 2021-02-26 ENCOUNTER — TRANSCRIPTION ENCOUNTER (OUTPATIENT)
Age: 61
End: 2021-02-26

## 2021-02-26 LAB
CYTOLOGY CVX/VAG DOC THIN PREP: NORMAL
HPV HIGH+LOW RISK DNA PNL CVX: NOT DETECTED

## 2021-03-02 ENCOUNTER — RX RENEWAL (OUTPATIENT)
Age: 61
End: 2021-03-02

## 2021-03-02 ENCOUNTER — TRANSCRIPTION ENCOUNTER (OUTPATIENT)
Age: 61
End: 2021-03-02

## 2021-04-14 PROBLEM — Z12.39 BREAST SCREENING: Status: RESOLVED | Noted: 2021-02-18 | Resolved: 2021-04-14

## 2021-04-15 ENCOUNTER — NON-APPOINTMENT (OUTPATIENT)
Age: 61
End: 2021-04-15

## 2021-04-15 ENCOUNTER — APPOINTMENT (OUTPATIENT)
Dept: HEART AND VASCULAR | Facility: CLINIC | Age: 61
End: 2021-04-15
Payer: MEDICAID

## 2021-04-15 VITALS
BODY MASS INDEX: 28.16 KG/M2 | HEART RATE: 83 BPM | DIASTOLIC BLOOD PRESSURE: 74 MMHG | OXYGEN SATURATION: 98 % | HEIGHT: 65 IN | SYSTOLIC BLOOD PRESSURE: 130 MMHG | WEIGHT: 169 LBS

## 2021-04-15 DIAGNOSIS — M19.041 PRIMARY OSTEOARTHRITIS, RIGHT HAND: ICD-10-CM

## 2021-04-15 DIAGNOSIS — I78.1 NEVUS, NON-NEOPLASTIC: ICD-10-CM

## 2021-04-15 DIAGNOSIS — Q76.49 OTHER CONGENITAL MALFORMATIONS OF SPINE, NOT ASSOCIATED WITH SCOLIOSIS: ICD-10-CM

## 2021-04-15 DIAGNOSIS — H69.83 OTHER SPECIFIED DISORDERS OF EUSTACHIAN TUBE, BILATERAL: ICD-10-CM

## 2021-04-15 DIAGNOSIS — Z12.39 ENCOUNTER FOR OTHER SCREENING FOR MALIGNANT NEOPLASM OF BREAST: ICD-10-CM

## 2021-04-15 DIAGNOSIS — Z86.69 PERSONAL HISTORY OF OTHER DISEASES OF THE NERVOUS SYSTEM AND SENSE ORGANS: ICD-10-CM

## 2021-04-15 DIAGNOSIS — L98.8 OTHER SPECIFIED DISORDERS OF THE SKIN AND SUBCUTANEOUS TISSUE: ICD-10-CM

## 2021-04-15 DIAGNOSIS — M19.042 PRIMARY OSTEOARTHRITIS, LEFT HAND: ICD-10-CM

## 2021-04-15 DIAGNOSIS — Z87.2 PERSONAL HISTORY OF DISEASES OF THE SKIN AND SUBCUTANEOUS TISSUE: ICD-10-CM

## 2021-04-15 PROCEDURE — 99072 ADDL SUPL MATRL&STAF TM PHE: CPT

## 2021-04-15 PROCEDURE — 93000 ELECTROCARDIOGRAM COMPLETE: CPT

## 2021-04-15 PROCEDURE — 99213 OFFICE O/P EST LOW 20 MIN: CPT | Mod: 25

## 2021-04-15 NOTE — PHYSICAL EXAM
[FreeTextEntry1] : + overweight [S3] : no S3 [S4] : no S4 [Right Carotid Bruit] : no bruit heard over the right carotid [Left Carotid Bruit] : no bruit heard over the left carotid [Right Femoral Bruit] : no bruit heard over the right femoral artery [Left Femoral Bruit] : no bruit heard over the left femoral artery

## 2021-04-15 NOTE — HISTORY OF PRESENT ILLNESS
[FreeTextEntry1] : Ms. Barron presents for follow up and management of HTN and overweight.  She was followed by another cardiologist, Oscar Rogers MD for a number of years but wanted to establish care with a new cardiologist.   We had an extensive discussion about therapeutic lifestyle changes to promote increased cardiovascular fitness and achieving goal weight.  She had an echocardiogram on 06/04/20 which revealed an EF of 60% and was a normal study.  She denies chest pain, palpitations, or lower extremity edema.  She has modified her diet to improve overall health and has lost weight as a result.  She is now tolerating amlodipine well and her blood pressure is at goal.

## 2021-04-15 NOTE — ASSESSMENT
[FreeTextEntry1] : 1. HTN: BP at ACC/AHA 2017 guideline target\par       - continue amlodipine 5mg po qd \par       - she will continue to maintain a home BP log for my review\par \par 2. Overweight: BMI 28\par      - we had an extensive discussion about therapeutic lifestyle changes to promote increased cardiovascular fitness and achieving goal weight\par      - will send for a Women's Heart and Prevention consult (Beata Gambino MD)\par      - check lab work today\par \par \par \par

## 2021-04-15 NOTE — REASON FOR VISIT
[FreeTextEntry1] : Diagnostic Tests:\par ------------------------------------\par ECG:\par 04/15/21: NSR, CLIVE, PRWP.\par 05/06/20: NSR, CLIVE, PRWP. \par 01/11/19: NSR, CLIVE. \par 01/16/18: NSR, CLIVE. \par 05/19/16: NSR, CLIVE. \par 02/19/16: NSR, CLIVE. \par -------------------------------------\par Echo:\par 06/04/20: EF 60%, normal study. \par --------------------------------------\par Sleep studies:\par 06/18/20: AHI 1, effectively no ANUM

## 2021-04-16 LAB
24R-OH-CALCIDIOL SERPL-MCNC: 70.9 PG/ML
ALBUMIN SERPL ELPH-MCNC: 4.6 G/DL
ALP BLD-CCNC: 58 U/L
ALT SERPL-CCNC: 14 U/L
ANION GAP SERPL CALC-SCNC: 13 MMOL/L
AST SERPL-CCNC: 15 U/L
BASOPHILS # BLD AUTO: 0.02 K/UL
BASOPHILS NFR BLD AUTO: 0.4 %
BILIRUB SERPL-MCNC: 0.4 MG/DL
BUN SERPL-MCNC: 12 MG/DL
CALCIUM SERPL-MCNC: 9.7 MG/DL
CHLORIDE SERPL-SCNC: 103 MMOL/L
CHOLEST SERPL-MCNC: 244 MG/DL
CO2 SERPL-SCNC: 24 MMOL/L
CREAT SERPL-MCNC: 0.66 MG/DL
EOSINOPHIL # BLD AUTO: 0.12 K/UL
EOSINOPHIL NFR BLD AUTO: 2.1 %
ESTIMATED AVERAGE GLUCOSE: 117 MG/DL
GLUCOSE SERPL-MCNC: 92 MG/DL
HBA1C MFR BLD HPLC: 5.7 %
HCT VFR BLD CALC: 44.8 %
HDLC SERPL-MCNC: 87 MG/DL
HGB BLD-MCNC: 14.7 G/DL
IMM GRANULOCYTES NFR BLD AUTO: 0.2 %
LDLC SERPL CALC-MCNC: 143 MG/DL
LDLC SERPL DIRECT ASSAY-MCNC: 137 MG/DL
LYMPHOCYTES # BLD AUTO: 1.73 K/UL
LYMPHOCYTES NFR BLD AUTO: 30.9 %
MAN DIFF?: NORMAL
MCHC RBC-ENTMCNC: 30.4 PG
MCHC RBC-ENTMCNC: 32.8 GM/DL
MCV RBC AUTO: 92.8 FL
MONOCYTES # BLD AUTO: 0.4 K/UL
MONOCYTES NFR BLD AUTO: 7.1 %
NEUTROPHILS # BLD AUTO: 3.32 K/UL
NEUTROPHILS NFR BLD AUTO: 59.3 %
NONHDLC SERPL-MCNC: 158 MG/DL
PLATELET # BLD AUTO: 239 K/UL
POTASSIUM SERPL-SCNC: 4.6 MMOL/L
PROT SERPL-MCNC: 7.2 G/DL
RBC # BLD: 4.83 M/UL
RBC # FLD: 13.4 %
SODIUM SERPL-SCNC: 139 MMOL/L
TRIGL SERPL-MCNC: 74 MG/DL
TSH SERPL-ACNC: 0.51 UIU/ML
WBC # FLD AUTO: 5.6 K/UL

## 2021-06-24 ENCOUNTER — NON-APPOINTMENT (OUTPATIENT)
Age: 61
End: 2021-06-24

## 2021-07-02 ENCOUNTER — APPOINTMENT (OUTPATIENT)
Dept: DERMATOLOGY | Facility: CLINIC | Age: 61
End: 2021-07-02
Payer: MEDICAID

## 2021-07-02 DIAGNOSIS — L71.9 ROSACEA, UNSPECIFIED: ICD-10-CM

## 2021-07-02 DIAGNOSIS — L82.1 OTHER SEBORRHEIC KERATOSIS: ICD-10-CM

## 2021-07-02 PROCEDURE — 99214 OFFICE O/P EST MOD 30 MIN: CPT

## 2021-07-06 PROBLEM — L82.1 SEBORRHEIC KERATOSES: Status: ACTIVE | Noted: 2021-07-06

## 2021-07-26 ENCOUNTER — APPOINTMENT (OUTPATIENT)
Dept: HEART AND VASCULAR | Facility: CLINIC | Age: 61
End: 2021-07-26
Payer: MEDICAID

## 2021-07-26 VITALS
SYSTOLIC BLOOD PRESSURE: 144 MMHG | HEART RATE: 71 BPM | DIASTOLIC BLOOD PRESSURE: 82 MMHG | OXYGEN SATURATION: 98 % | BODY MASS INDEX: 29.16 KG/M2 | WEIGHT: 175 LBS | TEMPERATURE: 97.5 F | HEIGHT: 65 IN

## 2021-07-26 PROCEDURE — 99215 OFFICE O/P EST HI 40 MIN: CPT

## 2021-07-26 RX ORDER — SODIUM SULFACETAMIDE, SULFUR 90; 4 MG/G; MG/G
9-4 LIQUID TOPICAL
Qty: 1 | Refills: 3 | Status: DISCONTINUED | COMMUNITY
Start: 2021-07-02 | End: 2021-07-26

## 2021-07-27 ENCOUNTER — APPOINTMENT (OUTPATIENT)
Dept: ENDOCRINOLOGY | Facility: CLINIC | Age: 61
End: 2021-07-27
Payer: MEDICAID

## 2021-07-27 ENCOUNTER — APPOINTMENT (OUTPATIENT)
Dept: INTERNAL MEDICINE | Facility: CLINIC | Age: 61
End: 2021-07-27
Payer: MEDICAID

## 2021-07-27 VITALS
HEIGHT: 65 IN | TEMPERATURE: 97.2 F | WEIGHT: 175 LBS | HEART RATE: 77 BPM | OXYGEN SATURATION: 98 % | BODY MASS INDEX: 29.16 KG/M2 | SYSTOLIC BLOOD PRESSURE: 143 MMHG | DIASTOLIC BLOOD PRESSURE: 76 MMHG

## 2021-07-27 VITALS
HEART RATE: 66 BPM | TEMPERATURE: 96.6 F | BODY MASS INDEX: 28.82 KG/M2 | DIASTOLIC BLOOD PRESSURE: 90 MMHG | WEIGHT: 173 LBS | SYSTOLIC BLOOD PRESSURE: 142 MMHG | OXYGEN SATURATION: 98 % | HEIGHT: 65 IN

## 2021-07-27 DIAGNOSIS — Z82.49 FAMILY HISTORY OF ISCHEMIC HEART DISEASE AND OTHER DISEASES OF THE CIRCULATORY SYSTEM: ICD-10-CM

## 2021-07-27 PROCEDURE — 99396 PREV VISIT EST AGE 40-64: CPT | Mod: 25

## 2021-07-27 PROCEDURE — 99204 OFFICE O/P NEW MOD 45 MIN: CPT

## 2021-07-27 PROCEDURE — 99386 PREV VISIT NEW AGE 40-64: CPT | Mod: 25

## 2021-07-27 PROCEDURE — G0447 BEHAVIOR COUNSEL OBESITY 15M: CPT

## 2021-07-27 PROCEDURE — 90471 IMMUNIZATION ADMIN: CPT

## 2021-07-27 PROCEDURE — 99214 OFFICE O/P EST MOD 30 MIN: CPT

## 2021-07-27 PROCEDURE — 90715 TDAP VACCINE 7 YRS/> IM: CPT

## 2021-07-27 RX ORDER — CONJUGATED ESTROGENS AND MEDROXYPROGESTERONE ACETATE .625; 2.5 MG/1; MG/1
0.625-2.5 TABLET, SUGAR COATED ORAL
Refills: 0 | Status: DISCONTINUED | COMMUNITY
End: 2021-07-27

## 2021-07-27 RX ORDER — AMLODIPINE BESYLATE 10 MG/1
10 TABLET ORAL
Qty: 30 | Refills: 0 | Status: COMPLETED | COMMUNITY
Start: 2021-05-04

## 2021-07-27 NOTE — HISTORY OF PRESENT ILLNESS
[FreeTextEntry1] : referral for preventative health [de-identified] : 60 yo F with PMHx HTN and obesity presents for a referral appointment from Dr. Guerra for preventative health.  She has gained weight over the past few months because she was eating lots of croissants and pizza at a nearby discounted shop.  She also is a "social drinker" and has 2 glasses-1 bottle of wine/day.  Over the past two weeks she has cut back on both and is focusing on a Mediterranean diet.  She was never started on a statin bc Dr. Guerra thought she should focus on diet/lifestyle changes.  She has been feeling stressed bc currently unemployed and searching for new job but denies feeling depressed.  She participates in yoga for stress relief and swims and goes to bar classes for exercise.  She is also an avid walker.  She had a sleep study that was negative for ANUM.  Per recommendation from her dermatologist, she is seeing an endocrinologist (Dr. Susan Nielsen) tomorrow for the "fat pad" on the back of her neck.  She is also seeing new internist (Dr. Viera) tomorrow.  She takes her amlodipine "every 3 days" because she states it makes her dizzy.  She occasionally wakes up in the morning and feels dizzy but has never passed out from it.  She takes her amlodipine in the morning after she wakes up.  She does not monitor her blood pressure at home.  She denies any leg swelling, chest pain, sob, changes in vision.  She entered menopause around [age] and denies any vaginal symptoms.  She does not smoke or use drugs.

## 2021-07-27 NOTE — PHYSICAL EXAM
[No Acute Distress] : no acute distress [Normal Sclera/Conjunctiva] : normal sclera/conjunctiva [No Lymphadenopathy] : no lymphadenopathy [Clear to Auscultation] : lungs were clear to auscultation bilaterally [Normal Rate] : normal rate  [Regular Rhythm] : with a regular rhythm [No Edema] : there was no peripheral edema [Soft] : abdomen soft [Non Tender] : non-tender [Normal Supraclavicular Nodes] : no supraclavicular lymphadenopathy [Normal Axillary Nodes] : no axillary lymphadenopathy [Normal Posterior Cervical Nodes] : no posterior cervical lymphadenopathy [Normal Anterior Cervical Nodes] : no anterior cervical lymphadenopathy [No CVA Tenderness] : no CVA  tenderness [No Rash] : no rash [Normal] : affect was normal and insight and judgment were intact [Declined Breast Exam] : declined breast exam  [de-identified] : Thyromegaly

## 2021-07-27 NOTE — COUNSELING
[Potential consequences of obesity discussed] : Potential consequences of obesity discussed [Benefits of weight loss discussed] : Benefits of weight loss discussed [Structured Weight Management Program suggested:] : Structured weight management program suggested [Encouraged to maintain food diary] : Encouraged to maintain food diary [Encouraged to increase physical activity] : Encouraged to increase physical activity [Encouraged to use exercise tracking device] : Encouraged to use exercise tracking device [FreeTextEntry1] : Wt Management Pgm @[facility] [FreeTextEntry4] : 15

## 2021-07-27 NOTE — ASSESSMENT
[FreeTextEntry1] : Patient is a 62 yo woman establishing endocrine care for the evaluation of a buffalo hump and possible thyroid nodule\par \par 1. Miller Hump\par -patient does not have any symptoms and did not notice anything.  Dermatologist recommended Kyella to improve the cosmetic appearance of posterior cervical fat pad. At the recommendation of a different dermatologist, patient was told to see endocrine\par -the fat pad is likely from forward head posture, poor body mechanics and increased weight gain\par -the patient has no other Cushing's stigmata-no proximal myopathy, no abdominal striae, no Cushingoid faces, no paper thin skin\par -she takes no steroids and feels well\par -will screen for Cushing's disease with midnight salivary cortisol tests X 2 samples. If negative, Cushing's Disease ruled out\par \par 2. Thyromegaly\par -patient's PCP has provided referral for thyroid US\par -on exam today, questionable left thyroid nodule\par -clinically and chemically euthyroid\par -if nodules on the US, she can follow up with me\par \par 3. Prediabetes\par -patient has had prediabetes since 2015 based on HbA1c levels\par -her diet has been very liberal during quarantine, eating more restaurant food, having more wine\par -patient is very motivated to get back to healthy diet\par -discussed concept of calorie deficit\par -continue with efforts at healthy lifestyle changes\par \par Follow up based on work up above. if Cushing's is ruled out, there are no thyroid abnormalities and prediabetes is stable, can return to endocrine care needed

## 2021-07-27 NOTE — HISTORY OF PRESENT ILLNESS
[FreeTextEntry1] : Patient is a 62 yo woman here for the evaluation of a buffalo hump and enlarged thyroid.\par \par Patient did not notice any problems but dermatologist noticed fat mass in the back of her neck.  She recommended Kybella to improve the cosmetics of the neck appearance.  Another dermatologist told her to get it checked out before anyone does anything. Patient is otherwise healthy and feels well. No reported symptoms. Does not take steroids. Mood is stable, no recent infection.  No stretch marks, no paper thin skin.  \par Diet: usually diet is very healthy, has tried raw cleansing in the past.  Discovered to good to go online.  This is a program where restaurants provide foods at discount.  Was indulging in foods recently like pizza and croissants, Russian bread, etc.  She was drinking a lot of wine as well.  Patient would like to delete the jhonatan and get back on a healthy track.  Patient used to cook every meal until recently and will go back to healthy food.  \par \par Patient had a new PCP visit and felt a swollen thyroid US\par No known family hx of thyroid disease\par Clinically without symptoms\par TSH normal\par No compressive symptoms

## 2021-07-27 NOTE — HEALTH RISK ASSESSMENT
[2 - 3 times a week (3 pts)] : 2 - 3  times a week (3 points) [Yes] : Yes [1 or 2 (0 pts)] : 1 or 2 (0 points) [Never (0 pts)] : Never (0 points) [No] : In the past 12 months have you used drugs other than those required for medical reasons? No [0] : 2) Feeling down, depressed, or hopeless: Not at all (0) [No Retinopathy] : No retinopathy [Patient reported mammogram was normal] : Patient reported mammogram was normal [Patient reported PAP Smear was normal] : Patient reported PAP Smear was normal [Patient reported colonoscopy was normal] : Patient reported colonoscopy was normal [HIV test declined] : HIV test declined [Hepatitis C test declined] : Hepatitis C test declined [Alone] : lives alone [Employed] : employed [Single] : single [Fully functional (bathing, dressing, toileting, transferring, walking, feeding)] : Fully functional (bathing, dressing, toileting, transferring, walking, feeding) [Fully functional (using the telephone, shopping, preparing meals, housekeeping, doing laundry, using] : Fully functional and needs no help or supervision to perform IADLs (using the telephone, shopping, preparing meals, housekeeping, doing laundry, using transportation, managing medications and managing finances) [Smoke Detector] : smoke detector [Carbon Monoxide Detector] : carbon monoxide detector [Seat Belt] :  uses seat belt [Sunscreen] : uses sunscreen [With Patient/Caregiver] : , with patient/caregiver [Designated Healthcare Proxy] : Designated healthcare proxy [Name: ___] : Health Care Proxy's Name: [unfilled]  [Relationship: ___] : Relationship: [unfilled] [] : No [Audit-CScore] : 3 [[id number]] : 0 [EyeExamDate] : 1/1/2021 [Sexually Active] : not sexually active [Reports changes in hearing] : Reports no changes in hearing [Reports changes in dental health] : Reports no changes in dental health [Guns at Home] : no guns at home [MammogramDate] : 4/1/2021 [PapSmearDate] : 4/1/2021 [ColonoscopyDate] : 2016 [ColonoscopyComments] : scheduled tomorrow  [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

## 2021-07-27 NOTE — PLAN
[FreeTextEntry1] : #Prevention\par Referred by Dr. Guerra for preventative health\par - participates in daily exercise and does not need improvement in this area\par - referral to dietician Apoorva Swift (focus on mindful eating)\par - encouraged patient to incorporate high-fiber diet and limiting saturated fats \par - she is aware of the exact behaviors that need change and is looking for more structure and working on motivation to get to her goals \par \par HTN\par Follows with Dr. Guerra\par - 144/82 during visit\par - c/w amlodipine\par - advised to limit salt and cut back on alcohol intake\par \par Follow up in 1 month

## 2021-07-27 NOTE — END OF VISIT
[] : Resident [FreeTextEntry3] : Patient seen and examined. Case discussed with medicine resident. Agree with plan as detailed above.

## 2021-07-27 NOTE — PHYSICAL EXAM
[Alert] : alert [Well Nourished] : well nourished [No Acute Distress] : no acute distress [EOMI] : extra ocular movement intact [Normal Hearing] : hearing was normal [Thyroid Not Enlarged] : the thyroid was not enlarged [No Respiratory Distress] : no respiratory distress [No Accessory Muscle Use] : no accessory muscle use [Clear to Auscultation] : lungs were clear to auscultation bilaterally [Normal S1, S2] : normal S1 and S2 [Normal Rate] : heart rate was normal [Normal Bowel Sounds] : normal bowel sounds [Not Tender] : non-tender [Soft] : abdomen soft [No Stigmata of Cushings Syndrome] : no stigmata of Cushings Syndrome [Normal Gait] : normal gait [No Joint Swelling] : no joint swelling seen [Normal Strength/Tone] : muscle strength and tone were normal [No Rash] : no rash [Abdominal Striae] : no abdominal striae [Acanthosis Nigricans] : no acanthosis nigricans [Hirsutism] : no hirsutism [No Motor Deficits] : the motor exam was normal [Normal Affect] : the affect was normal [Normal Insight/Judgement] : insight and judgment were intact [Normal Mood] : the mood was normal [de-identified] : nodular thyroid ?left thyroid nodule

## 2021-07-27 NOTE — HISTORY OF PRESENT ILLNESS
[FreeTextEntry1] : 1. Pt presents to establish Primary Care and is requesting an Annual Physical.\par 2 J&J 4/2021 \par 3. "I want to lose weight"\par

## 2021-07-27 NOTE — REVIEW OF SYSTEMS
[Fatigue] : no fatigue [Decreased Appetite] : appetite not decreased [Dysphagia] : no dysphagia [Dysphonia] : no dysphonia [Chest Pain] : no chest pain [Shortness Of Breath] : no shortness of breath [Nausea] : no nausea [Constipation] : no constipation [Vomiting] : no vomiting [Diarrhea] : no diarrhea [As Noted in HPI] : as noted in HPI [Headaches] : no headaches

## 2021-07-28 ENCOUNTER — RESULT REVIEW (OUTPATIENT)
Age: 61
End: 2021-07-28

## 2021-07-28 ENCOUNTER — NON-APPOINTMENT (OUTPATIENT)
Age: 61
End: 2021-07-28

## 2021-07-28 ENCOUNTER — APPOINTMENT (OUTPATIENT)
Dept: ULTRASOUND IMAGING | Facility: CLINIC | Age: 61
End: 2021-07-28
Payer: MEDICAID

## 2021-07-28 ENCOUNTER — EMERGENCY (EMERGENCY)
Facility: HOSPITAL | Age: 61
LOS: 1 days | Discharge: HOME | End: 2021-07-28
Attending: EMERGENCY MEDICINE | Admitting: EMERGENCY MEDICINE
Payer: MEDICAID

## 2021-07-28 ENCOUNTER — OUTPATIENT (OUTPATIENT)
Dept: OUTPATIENT SERVICES | Facility: HOSPITAL | Age: 61
LOS: 1 days | End: 2021-07-28

## 2021-07-28 ENCOUNTER — APPOINTMENT (OUTPATIENT)
Dept: RADIOLOGY | Facility: CLINIC | Age: 61
End: 2021-07-28
Payer: MEDICAID

## 2021-07-28 ENCOUNTER — TRANSCRIPTION ENCOUNTER (OUTPATIENT)
Age: 61
End: 2021-07-28

## 2021-07-28 VITALS
OXYGEN SATURATION: 98 % | DIASTOLIC BLOOD PRESSURE: 96 MMHG | HEART RATE: 70 BPM | HEIGHT: 65 IN | WEIGHT: 175.05 LBS | RESPIRATION RATE: 18 BRPM | SYSTOLIC BLOOD PRESSURE: 176 MMHG | TEMPERATURE: 98 F

## 2021-07-28 VITALS
SYSTOLIC BLOOD PRESSURE: 166 MMHG | HEART RATE: 57 BPM | DIASTOLIC BLOOD PRESSURE: 80 MMHG | OXYGEN SATURATION: 97 % | TEMPERATURE: 98 F | RESPIRATION RATE: 18 BRPM

## 2021-07-28 DIAGNOSIS — Z86.69 PERSONAL HISTORY OF OTHER DISEASES OF THE NERVOUS SYSTEM AND SENSE ORGANS: ICD-10-CM

## 2021-07-28 DIAGNOSIS — I10 ESSENTIAL (PRIMARY) HYPERTENSION: ICD-10-CM

## 2021-07-28 DIAGNOSIS — Z79.899 OTHER LONG TERM (CURRENT) DRUG THERAPY: ICD-10-CM

## 2021-07-28 DIAGNOSIS — R79.89 OTHER SPECIFIED ABNORMAL FINDINGS OF BLOOD CHEMISTRY: ICD-10-CM

## 2021-07-28 DIAGNOSIS — Z23 ENCOUNTER FOR IMMUNIZATION: ICD-10-CM

## 2021-07-28 DIAGNOSIS — H81.10 BENIGN PAROXYSMAL VERTIGO, UNSPECIFIED EAR: ICD-10-CM

## 2021-07-28 LAB
25(OH)D3 SERPL-MCNC: 31.2 NG/ML
ALBUMIN SERPL ELPH-MCNC: 4.6 G/DL
ALP BLD-CCNC: 47 U/L
ALT SERPL-CCNC: 18 U/L
ANION GAP SERPL CALC-SCNC: 11 MMOL/L
ANION GAP SERPL CALC-SCNC: 14 MMOL/L — SIGNIFICANT CHANGE UP (ref 5–17)
APPEARANCE: CLEAR
AST SERPL-CCNC: 77 U/L
BACTERIA: NEGATIVE
BASE EXCESS BLDV CALC-SCNC: 1 MMOL/L — SIGNIFICANT CHANGE UP (ref -2–3)
BASOPHILS # BLD AUTO: 0.02 K/UL
BASOPHILS # BLD AUTO: 0.02 K/UL — SIGNIFICANT CHANGE UP (ref 0–0.2)
BASOPHILS NFR BLD AUTO: 0.3 % — SIGNIFICANT CHANGE UP (ref 0–2)
BASOPHILS NFR BLD AUTO: 0.4 %
BILIRUB SERPL-MCNC: 0.4 MG/DL
BILIRUBIN URINE: NEGATIVE
BLOOD URINE: NEGATIVE
BUN SERPL-MCNC: 15 MG/DL
BUN SERPL-MCNC: 9 MG/DL — SIGNIFICANT CHANGE UP (ref 7–23)
CA-I SERPL-SCNC: 1.15 MMOL/L — SIGNIFICANT CHANGE UP (ref 1.15–1.33)
CALCIUM SERPL-MCNC: 9.2 MG/DL — SIGNIFICANT CHANGE UP (ref 8.4–10.5)
CALCIUM SERPL-MCNC: 9.7 MG/DL
CHLORIDE SERPL-SCNC: 101 MMOL/L
CHLORIDE SERPL-SCNC: 104 MMOL/L — SIGNIFICANT CHANGE UP (ref 96–108)
CHOLEST SERPL-MCNC: 247 MG/DL
CO2 BLDV-SCNC: 27.3 MMOL/L — HIGH (ref 22–26)
CO2 SERPL-SCNC: 22 MMOL/L
CO2 SERPL-SCNC: 23 MMOL/L — SIGNIFICANT CHANGE UP (ref 22–31)
COLOR: YELLOW
CREAT SERPL-MCNC: 0.59 MG/DL
CREAT SERPL-MCNC: 0.71 MG/DL — SIGNIFICANT CHANGE UP (ref 0.5–1.3)
EOSINOPHIL # BLD AUTO: 0.11 K/UL — SIGNIFICANT CHANGE UP (ref 0–0.5)
EOSINOPHIL # BLD AUTO: 0.13 K/UL
EOSINOPHIL NFR BLD AUTO: 1.9 % — SIGNIFICANT CHANGE UP (ref 0–6)
EOSINOPHIL NFR BLD AUTO: 2.3 %
ESTIMATED AVERAGE GLUCOSE: 111 MG/DL
FOLATE SERPL-MCNC: >20 NG/ML
GAS PNL BLDV: 137 MMOL/L — SIGNIFICANT CHANGE UP (ref 136–145)
GAS PNL BLDV: SIGNIFICANT CHANGE UP
GLUCOSE QUALITATIVE U: NEGATIVE
GLUCOSE SERPL-MCNC: 106 MG/DL
GLUCOSE SERPL-MCNC: 108 MG/DL — HIGH (ref 70–99)
HBA1C MFR BLD HPLC: 5.5 %
HCO3 BLDV-SCNC: 26 MMOL/L — SIGNIFICANT CHANGE UP (ref 22–29)
HCT VFR BLD CALC: 41.7 % — SIGNIFICANT CHANGE UP (ref 34.5–45)
HCT VFR BLD CALC: 42.7 %
HDLC SERPL-MCNC: 81 MG/DL
HGB BLD-MCNC: 14 G/DL — SIGNIFICANT CHANGE UP (ref 11.5–15.5)
HGB BLD-MCNC: 14.3 G/DL
HYALINE CASTS: 0 /LPF
IMM GRANULOCYTES NFR BLD AUTO: 0.2 %
IMM GRANULOCYTES NFR BLD AUTO: 0.2 % — SIGNIFICANT CHANGE UP (ref 0–1.5)
KETONES URINE: NEGATIVE
LDLC SERPL CALC-MCNC: 139 MG/DL
LEUKOCYTE ESTERASE URINE: ABNORMAL
LYMPHOCYTES # BLD AUTO: 1.52 K/UL — SIGNIFICANT CHANGE UP (ref 1–3.3)
LYMPHOCYTES # BLD AUTO: 1.61 K/UL
LYMPHOCYTES # BLD AUTO: 26.3 % — SIGNIFICANT CHANGE UP (ref 13–44)
LYMPHOCYTES NFR BLD AUTO: 28.5 %
MAN DIFF?: NORMAL
MCHC RBC-ENTMCNC: 30.6 PG
MCHC RBC-ENTMCNC: 30.7 PG — SIGNIFICANT CHANGE UP (ref 27–34)
MCHC RBC-ENTMCNC: 33.5 GM/DL
MCHC RBC-ENTMCNC: 33.6 GM/DL — SIGNIFICANT CHANGE UP (ref 32–36)
MCV RBC AUTO: 91.2 FL
MCV RBC AUTO: 91.4 FL — SIGNIFICANT CHANGE UP (ref 80–100)
MICROSCOPIC-UA: NORMAL
MONOCYTES # BLD AUTO: 0.26 K/UL — SIGNIFICANT CHANGE UP (ref 0–0.9)
MONOCYTES # BLD AUTO: 0.35 K/UL
MONOCYTES NFR BLD AUTO: 4.5 % — SIGNIFICANT CHANGE UP (ref 2–14)
MONOCYTES NFR BLD AUTO: 6.2 %
NEUTROPHILS # BLD AUTO: 3.53 K/UL
NEUTROPHILS # BLD AUTO: 3.86 K/UL — SIGNIFICANT CHANGE UP (ref 1.8–7.4)
NEUTROPHILS NFR BLD AUTO: 62.4 %
NEUTROPHILS NFR BLD AUTO: 66.8 % — SIGNIFICANT CHANGE UP (ref 43–77)
NITRITE URINE: NEGATIVE
NONHDLC SERPL-MCNC: 166 MG/DL
NRBC # BLD: 0 /100 WBCS — SIGNIFICANT CHANGE UP (ref 0–0)
PCO2 BLDV: 42 MMHG — SIGNIFICANT CHANGE UP (ref 39–42)
PH BLDV: 7.4 — SIGNIFICANT CHANGE UP (ref 7.32–7.43)
PH URINE: 6
PLATELET # BLD AUTO: 190 K/UL — SIGNIFICANT CHANGE UP (ref 150–400)
PLATELET # BLD AUTO: 315 K/UL
PO2 BLDV: 42 MMHG — SIGNIFICANT CHANGE UP
POTASSIUM BLDV-SCNC: 3.6 MMOL/L — SIGNIFICANT CHANGE UP (ref 3.5–5.1)
POTASSIUM SERPL-MCNC: 3.9 MMOL/L — SIGNIFICANT CHANGE UP (ref 3.5–5.3)
POTASSIUM SERPL-SCNC: 3.9 MMOL/L — SIGNIFICANT CHANGE UP (ref 3.5–5.3)
POTASSIUM SERPL-SCNC: 6.9 MMOL/L
PROT SERPL-MCNC: 7.5 G/DL
PROTEIN URINE: NEGATIVE
RBC # BLD: 4.56 M/UL — SIGNIFICANT CHANGE UP (ref 3.8–5.2)
RBC # BLD: 4.68 M/UL
RBC # FLD: 12.5 % — SIGNIFICANT CHANGE UP (ref 10.3–14.5)
RBC # FLD: 12.9 %
RED BLOOD CELLS URINE: 1 /HPF
SAO2 % BLDV: 74.8 % — SIGNIFICANT CHANGE UP
SODIUM SERPL-SCNC: 134 MMOL/L
SODIUM SERPL-SCNC: 141 MMOL/L — SIGNIFICANT CHANGE UP (ref 135–145)
SPECIFIC GRAVITY URINE: 1.02
SQUAMOUS EPITHELIAL CELLS: 3 /HPF
T PALLIDUM AB SER QL IA: NEGATIVE
TRIGL SERPL-MCNC: 139 MG/DL
TSH SERPL-ACNC: 0.66 UIU/ML
UROBILINOGEN URINE: NORMAL
VIT B12 SERPL-MCNC: 1374 PG/ML
WBC # BLD: 5.78 K/UL — SIGNIFICANT CHANGE UP (ref 3.8–10.5)
WBC # FLD AUTO: 5.65 K/UL
WBC # FLD AUTO: 5.78 K/UL — SIGNIFICANT CHANGE UP (ref 3.8–10.5)
WHITE BLOOD CELLS URINE: 6 /HPF

## 2021-07-28 PROCEDURE — 36415 COLL VENOUS BLD VENIPUNCTURE: CPT

## 2021-07-28 PROCEDURE — 82803 BLOOD GASES ANY COMBINATION: CPT

## 2021-07-28 PROCEDURE — 76536 US EXAM OF HEAD AND NECK: CPT | Mod: 26

## 2021-07-28 PROCEDURE — 84132 ASSAY OF SERUM POTASSIUM: CPT

## 2021-07-28 PROCEDURE — 99283 EMERGENCY DEPT VISIT LOW MDM: CPT

## 2021-07-28 PROCEDURE — 77085 DXA BONE DENSITY AXL VRT FX: CPT | Mod: 26

## 2021-07-28 PROCEDURE — 85025 COMPLETE CBC W/AUTO DIFF WBC: CPT

## 2021-07-28 PROCEDURE — 84295 ASSAY OF SERUM SODIUM: CPT

## 2021-07-28 PROCEDURE — 99285 EMERGENCY DEPT VISIT HI MDM: CPT

## 2021-07-28 PROCEDURE — 93010 ELECTROCARDIOGRAM REPORT: CPT

## 2021-07-28 PROCEDURE — 82330 ASSAY OF CALCIUM: CPT

## 2021-07-28 PROCEDURE — 80048 BASIC METABOLIC PNL TOTAL CA: CPT

## 2021-07-28 PROCEDURE — 93005 ELECTROCARDIOGRAM TRACING: CPT

## 2021-07-28 NOTE — ED ADULT NURSE NOTE - OBJECTIVE STATEMENT
Pt reports annual bloodwork done yesterday and was told potassium was 6.9. Pt denies any symptoms, no chest pain, sob, dizziness, n/v, weakness.

## 2021-07-28 NOTE — ED PROVIDER NOTE - PATIENT PORTAL LINK FT
You can access the FollowMyHealth Patient Portal offered by [facility] by registering at the following website: [email or web address]. By joining [employer]’s FollowMyHealth portal, you will also be able to view your health information using other applications (apps) compatible with our system.

## 2021-07-28 NOTE — ED PROVIDER NOTE - OBJECTIVE STATEMENT
62 yo female h/o htn, bpv, sciatica sent for eval after outpt labs w K 6.9 (grossly hemolyzed on lab report).  Pt w/o sx or complaint.  Cr wnl on labs yest. 62 yo female h/o htn, bpv, sciatica sent for eval after outpt labs w K 6.9 (grossly hemolyzed on lab report).  Pt w/o sx   Cr wnl on labs yest. Pt reports feeling anxious bc she has a colonoscopy planned today at 11 (+ freq bm 2/2 prep).  Pt also notes liquid diet last few days 2/2 upcoming scope.  Pt did not take bp meds today - reports she only takes every few days 2/2 dizziness related to meds.

## 2021-07-28 NOTE — ED ADULT TRIAGE NOTE - CHIEF COMPLAINT QUOTE
Patient was sent by pmd for potassium level of 6.9  from blood work done yesterday . Pt. denies any discomfort .

## 2021-07-28 NOTE — ED PROVIDER NOTE - CARE PROVIDER_API CALL
Reyna Viera; MPH)  Internal Medicine  [address] [place] [address]  Phone: [phone]  Fax: [phone]  Follow Up Time:

## 2021-07-28 NOTE — ED PROVIDER NOTE - NS ED MD EM SELECTION
RECORDS STATUS - ALL OTHER DIAGNOSIS      RECORDS RECEIVED FROM: [facility]   DATE RECEIVED: 1/8/2021   NOTES STATUS DETAILS   OFFICE NOTE from referring provider Complete Chetna Burgess MD   OFFICE NOTE from medical oncologist N/A    DISCHARGE SUMMARY from hospital N/A    DISCHARGE REPORT from the ER     OPERATIVE REPORT N/A    MEDICATION LIST Complete [facility]   CLINICAL TRIAL TREATMENTS TO DATE     LABS     PATHOLOGY REPORTS     ANYTHING RELATED TO DIAGNOSIS Complete Labs last updated on 10/29/2020    GENONOMIC TESTING     TYPE:     IMAGING (NEED IMAGES & REPORT)     CT SCANS     MRI Complete- [facility] 12/18/2020 [facility] MRI Pelvis    MAMMO     ULTRASOUND Complete 10/29/2020 US Pelvis Complete    PET       Action    Action Taken 1/7/2021 12:05pm     I called pt Monica - her phone went to .          
[id number] Detailed

## 2021-07-28 NOTE — ED PROVIDER NOTE - CLINICAL SUMMARY MEDICAL DECISION MAKING FREE TEXT BOX
Pt sent for hemolyzed K 6.9 on labs drawn yest.  Suspect false elevation 2/2 hemolysis.  Will repeat; low concern for true hyperkalemia.  Likely dc to fu as outpt.

## 2021-07-29 ENCOUNTER — TRANSCRIPTION ENCOUNTER (OUTPATIENT)
Age: 61
End: 2021-07-29

## 2021-08-03 ENCOUNTER — NON-APPOINTMENT (OUTPATIENT)
Age: 61
End: 2021-08-03

## 2021-08-03 DIAGNOSIS — E01.0 IODINE-DEFICIENCY RELATED DIFFUSE (ENDEMIC) GOITER: ICD-10-CM

## 2021-08-03 LAB — CORTIS SAL-MCNC: NORMAL

## 2021-08-06 LAB — CORTIS SAL-MCNC: NORMAL

## 2021-08-10 ENCOUNTER — TRANSCRIPTION ENCOUNTER (OUTPATIENT)
Age: 61
End: 2021-08-10

## 2021-08-10 LAB
ALBUMIN SERPL ELPH-MCNC: 4.7 G/DL
ALP BLD-CCNC: 57 U/L
ALT SERPL-CCNC: 13 U/L
ANION GAP SERPL CALC-SCNC: 12 MMOL/L
AST SERPL-CCNC: 15 U/L
BILIRUB SERPL-MCNC: 0.4 MG/DL
BUN SERPL-MCNC: 13 MG/DL
CALCIUM SERPL-MCNC: 9.7 MG/DL
CHLORIDE SERPL-SCNC: 102 MMOL/L
CO2 SERPL-SCNC: 25 MMOL/L
CREAT SERPL-MCNC: 0.71 MG/DL
GLUCOSE SERPL-MCNC: 102 MG/DL
POTASSIUM SERPL-SCNC: 4.6 MMOL/L
PROT SERPL-MCNC: 7.1 G/DL
SODIUM SERPL-SCNC: 140 MMOL/L
VIT B12 SERPL-MCNC: 1488 PG/ML

## 2021-08-11 ENCOUNTER — TRANSCRIPTION ENCOUNTER (OUTPATIENT)
Age: 61
End: 2021-08-11

## 2021-08-27 ENCOUNTER — APPOINTMENT (OUTPATIENT)
Dept: HEART AND VASCULAR | Facility: CLINIC | Age: 61
End: 2021-08-27

## 2021-09-21 ENCOUNTER — RX RENEWAL (OUTPATIENT)
Age: 61
End: 2021-09-21

## 2021-09-21 ENCOUNTER — TRANSCRIPTION ENCOUNTER (OUTPATIENT)
Age: 61
End: 2021-09-21

## 2021-09-29 PROBLEM — I10 ESSENTIAL (PRIMARY) HYPERTENSION: Chronic | Status: ACTIVE | Noted: 2021-07-28

## 2021-11-04 ENCOUNTER — APPOINTMENT (OUTPATIENT)
Dept: OBGYN | Facility: CLINIC | Age: 61
End: 2021-11-04
Payer: MEDICAID

## 2021-11-04 ENCOUNTER — NON-APPOINTMENT (OUTPATIENT)
Age: 61
End: 2021-11-04

## 2021-11-04 ENCOUNTER — APPOINTMENT (OUTPATIENT)
Dept: RHEUMATOLOGY | Facility: CLINIC | Age: 61
End: 2021-11-04
Payer: MEDICAID

## 2021-11-04 VITALS
DIASTOLIC BLOOD PRESSURE: 90 MMHG | SYSTOLIC BLOOD PRESSURE: 130 MMHG | BODY MASS INDEX: 29.49 KG/M2 | WEIGHT: 177 LBS | HEIGHT: 65 IN

## 2021-11-04 VITALS
WEIGHT: 177 LBS | DIASTOLIC BLOOD PRESSURE: 77 MMHG | HEART RATE: 81 BPM | HEIGHT: 65 IN | BODY MASS INDEX: 29.49 KG/M2 | OXYGEN SATURATION: 95 % | TEMPERATURE: 97.6 F | SYSTOLIC BLOOD PRESSURE: 150 MMHG

## 2021-11-04 DIAGNOSIS — M19.90 UNSPECIFIED OSTEOARTHRITIS, UNSPECIFIED SITE: ICD-10-CM

## 2021-11-04 PROCEDURE — 99203 OFFICE O/P NEW LOW 30 MIN: CPT

## 2021-11-04 PROCEDURE — 99213 OFFICE O/P EST LOW 20 MIN: CPT

## 2021-11-04 NOTE — PHYSICAL EXAM
[General Appearance - Alert] : alert [General Appearance - In No Acute Distress] : in no acute distress [General Appearance - Well Nourished] : well nourished [General Appearance - Well Developed] : well developed [] : no rash [FreeTextEntry1] : There is osteoarthritis of the basal joints clinically as well as DIP joints particularly the fifth digit there is a tiny small cyst that is nontender that appears to be in the skin fourth digit just proximal to the nail does not appear to be arthritic-left knee reveals no effusion there is crepitus present

## 2021-11-04 NOTE — DATA REVIEWED
[FreeTextEntry1] : I have reviewed x-rays of her knee done in 2000 16 which shows evidence of osteoarthritis I have reviewed x-rays of her hands her DIP joints showing degenerative changes in 2016 and again x-rays of her hand showing degenerative changes at the DIP joints as well as basal joints in 2018

## 2021-11-04 NOTE — HISTORY OF PRESENT ILLNESS
[Patient reported mammogram was normal] : Patient reported mammogram was normal [Patient reported PAP Smear was normal] : Patient reported PAP Smear was normal [Patient reported colonoscopy was normal] : Patient reported colonoscopy was normal [Menarche Age: ____] : age at menarche was [unfilled] [Post-Menopause, No Sxs] : post-menopausal, currently without symptoms [Menopause Age: ____] : age at menopause was [unfilled] [Previously active] : previously active [No] : No [Patient refuses STI testing] : Patient refuses STI testing [FreeTextEntry1] : Ms. Barron 60y/o presents to the office today for 6 month follow up of HRT. Patient has longterm menopausal sx of hot flashes. Has been on prempro for past 10 years without complications. Currently has mild hypertension controlled with Amlodipine, follow Dr. Viera. Denies hx of any DVT, CVA or breast cancer. \par \par Post menopausal since 2011\par Last well woman exam was 2/2021.  [Mammogramdate] : 2/19/21 [PapSmeardate] : 2/18/21 [ColonoscopyDate] : 5/27/15

## 2021-11-04 NOTE — COUNSELING
[Nutrition/ Exercise/ Weight Management] : nutrition, exercise, weight management [Body Image] : body image [Vitamins/Supplements] : vitamins/supplements [Medication Management] : medication management [FreeTextEntry2] : post menopausal symptoms

## 2021-11-04 NOTE — PLAN
[FreeTextEntry1] : Ms. Barron declines physical exam, present for 6 month f/u for long term HRT. Patient has no complaints. \par She currently does not take it every other day as discussed at her last appointment but will take it everyday and sporadically skip a day or two. \par I discussed the importance of sticking to a schedule, and patient stated she would make an effort. \par referral for TVUS for endometrial lining thickness follow up given \par f/u in 6 months for annual and HRT f/u\par \par patient c/o of ear pain and flying to [place] today. Antibiotic ear drops ordered as a courtesy but instructed patient to go to PCP or urgent care if symptoms do not resolve or become worse within in 1 week. \par \par all questions answered and patient agreed with this plan

## 2021-11-04 NOTE — HISTORY OF PRESENT ILLNESS
[FreeTextEntry1] : This is a 61-year-old woman she presents for an initial evaluation she reports that she has had pain at the base of her thumbs both bilaterally she has been given acupuncture which has not really helped and is actually been painful she is also had DIP changes particularly at the DIP joints of the fifth digit she has also developed a ridge in the nail at the fourth digit on the right hand as well as a small cyst just proximal to the nail-aside from the ridge she has no other nail changes she does not have psoriasis-she has used diclofenac gel in the past and has had some difficulty getting it recently has not had any morning stiffness or other significant issues

## 2021-11-04 NOTE — ASSESSMENT
[FreeTextEntry1] : 61-year-old woman with clinical evidence of osteoarthritis of the basal joints as well as DIP joints on prior x-rays as well as osteoarthritis of the left knee clinically and on prior x-rays we have discussed the role for quad strengthening weight loss as well as diclofenac gel no evidence of any inflammatory arthritis

## 2021-11-04 NOTE — REVIEW OF SYSTEMS
[Joint Stiffness] : joint stiffness [Joint Swelling] : joint swelling [Negative] : Genitourinary [FreeTextEntry9] : sees endocronigist

## 2021-11-04 NOTE — REVIEW OF SYSTEMS
[As Noted in HPI] : as noted in HPI [Feeling Poorly] : not feeling poorly [Feeling Tired] : not feeling tired

## 2021-11-09 ENCOUNTER — APPOINTMENT (OUTPATIENT)
Dept: ULTRASOUND IMAGING | Facility: CLINIC | Age: 61
End: 2021-11-09
Payer: MEDICAID

## 2021-11-09 ENCOUNTER — OUTPATIENT (OUTPATIENT)
Dept: OUTPATIENT SERVICES | Facility: HOSPITAL | Age: 61
LOS: 1 days | End: 2021-11-09

## 2021-11-09 ENCOUNTER — RESULT REVIEW (OUTPATIENT)
Age: 61
End: 2021-11-09

## 2021-11-09 PROCEDURE — 76856 US EXAM PELVIC COMPLETE: CPT | Mod: 26,59

## 2021-11-09 PROCEDURE — 76830 TRANSVAGINAL US NON-OB: CPT | Mod: 26

## 2021-11-09 PROCEDURE — 93975 VASCULAR STUDY: CPT | Mod: 26

## 2021-11-10 ENCOUNTER — TRANSCRIPTION ENCOUNTER (OUTPATIENT)
Age: 61
End: 2021-11-10

## 2021-11-19 ENCOUNTER — APPOINTMENT (OUTPATIENT)
Dept: PHYSICAL MEDICINE AND REHAB | Facility: CLINIC | Age: 61
End: 2021-11-19
Payer: MEDICAID

## 2021-11-19 ENCOUNTER — RESULT REVIEW (OUTPATIENT)
Age: 61
End: 2021-11-19

## 2021-11-19 ENCOUNTER — OUTPATIENT (OUTPATIENT)
Dept: OUTPATIENT SERVICES | Facility: HOSPITAL | Age: 61
LOS: 1 days | End: 2021-11-19
Payer: MEDICAID

## 2021-11-19 ENCOUNTER — APPOINTMENT (OUTPATIENT)
Dept: ORTHOPEDIC SURGERY | Facility: CLINIC | Age: 61
End: 2021-11-19
Payer: MEDICAID

## 2021-11-19 VITALS — HEIGHT: 65 IN | BODY MASS INDEX: 28.32 KG/M2 | WEIGHT: 170 LBS | RESPIRATION RATE: 16 BRPM

## 2021-11-19 VITALS — BODY MASS INDEX: 29.49 KG/M2 | HEIGHT: 65 IN | WEIGHT: 177 LBS | RESPIRATION RATE: 16 BRPM

## 2021-11-19 PROCEDURE — 99203 OFFICE O/P NEW LOW 30 MIN: CPT | Mod: 25

## 2021-11-19 PROCEDURE — 99204 OFFICE O/P NEW MOD 45 MIN: CPT | Mod: 25

## 2021-11-19 PROCEDURE — 76882 US LMTD JT/FCL EVL NVASC XTR: CPT | Mod: LT

## 2021-11-19 PROCEDURE — 73564 X-RAY EXAM KNEE 4 OR MORE: CPT | Mod: 26,50

## 2021-11-19 PROCEDURE — 20610 DRAIN/INJ JOINT/BURSA W/O US: CPT | Mod: 50

## 2021-11-19 PROCEDURE — 73110 X-RAY EXAM OF WRIST: CPT | Mod: RT

## 2021-11-19 PROCEDURE — 73564 X-RAY EXAM KNEE 4 OR MORE: CPT

## 2021-11-19 PROCEDURE — 20600 DRAIN/INJ JOINT/BURSA W/O US: CPT | Mod: F3

## 2021-11-19 RX ORDER — HYALURONATE SODIUM 20 MG/2 ML
20 SYRINGE (ML) INTRAARTICULAR
Qty: 2 | Refills: 0 | Status: ACTIVE | COMMUNITY
Start: 2021-11-19 | End: 1900-01-01

## 2021-11-19 NOTE — PHYSICAL EXAM
[FreeTextEntry1] : GEN:  NAD, AAOx3.\par INSPECTION:  No effusion, discoloration. Normal patellar tracking. \par GAIT: Non-antalgic.\par Knee AROM: Full and pain free.\par PALPATION:  No effusion, warmth or crepitus. No TTP patellar facets, medial/lateral joint line, popliteal fossa, patellar tendon, quad tendon, hamstrings, ITB.  \par MOTOR:  5/5 bilateral lower limbs. No spasticity, tremor, atrophy or contractures.\par SPECIAL:  Hyperflexion (-) bilaterally, Milagro (-) bilaterally, Medial/Lateral compression (-) bilaterally, Varus/Valgus stress (-) bilaterally, Single Leg Squat (-) bilaterally, Anterior/Posterior drawer (-) bilaterally, Lachman (-) bilaterally.

## 2021-11-19 NOTE — HISTORY OF PRESENT ILLNESS
[FreeTextEntry1] : Referring Physician: Dr. Reyna Viera MD\par \par Ms. ZAINAB CAMARILLO is a very pleasant 61 year female who comes in for evaluation of BILATERAL KNEE Pain that has been ongoing for years without any specific injury or inciting event. Patient has tried Heat which help relief. The pain is located primarily on BILATERAL KNEES intermittent and described as achy. The pain is rated as 8-10/10 and ranges from 10/10. The patient's symptoms are aggravated by Cold and alleviated by Heat. The patient is currently not working. The patient denies any night pain, numbness/tingling, weakness, or bowel/bladder dysfunction. The patient has no other complaints at this time.\par

## 2021-11-19 NOTE — PROCEDURE
[de-identified] : Procedure: Intra-articular BILATERAL Knee Joint Steroid & Anesthetic Injection \par \par Findings: The RIGHT & LEFT knee present with medial/lateral joint line tenderness. XRays reveal tricompartment degenerative joint disease. \par \par Injectate: 1 mL Kenalog (40mg/mL) with 4mL 1% Lidocaine\par \par After risks, benefits and alternatives were discussed and the patient expressed understanding, informed consent was obtained. Risks include but are not limited to bleeding, infection, worsening pain, nerve damage, scar formation. \par \par The RIGHT anterior inferomedial area was marked and prepped with Chloraprep.  Using sterile technique, a 25G 1.5 inch needle was advanced into the joint space and after negative aspiration for blood or synovial fluid, the above mentioned injectate was administered without any resistance. The above procedure was then repeated for the LEFT knee. \par \par At the conclusion of the procedure the needles were withdrawn.  Direct pressure was applied to the area.  A Band-Aid was used to cover the injection site.  There were no complications during or after the procedure.  The patient tolerated the procedure well and reported improvement in symptoms following administration of the injectate.  Post procedure instructions and follow up appointment were given. If there are any complications, the patient was instructed to call the office.

## 2021-11-19 NOTE — ASSESSMENT
[FreeTextEntry1] : Impression:\par 1. Bilateral Knee DJD\par \par Plan: The history, physical examination, and imaging were reviewed. The imaging results and diagnosis were discussed with the patient along with treatment options including physical therapy, oral medication, ultrasound guided injections, and surgery; as well as alternative therapeutics such as acupuncture and massage. We also discussed the importance of weight loss, ergonomics, and posture as it pertains to the management of the condition as well as overall health and well being. The patient is interested in interventional options for symptom reduction; CSI was performed in the office today, please see procedure note for full detail. I am recommending that the patient participate in a PT program. We emphasized the importance of the home exercise program. We will submit for Euflexxa as the next course of treatment. The patient will follow up in 3-4 months. The patient expressed verbal understanding and is in agreement with the plan of care. All of the patient's questions and concerns were addressed during today's visit.\par

## 2021-11-23 ENCOUNTER — RX RENEWAL (OUTPATIENT)
Age: 61
End: 2021-11-23

## 2022-03-15 ENCOUNTER — TRANSCRIPTION ENCOUNTER (OUTPATIENT)
Age: 62
End: 2022-03-15

## 2022-03-22 ENCOUNTER — EMERGENCY (EMERGENCY)
Facility: HOSPITAL | Age: 62
LOS: 1 days | Discharge: HOME | End: 2022-03-22
Admitting: EMERGENCY MEDICINE
Payer: MEDICAID

## 2022-03-22 VITALS
OXYGEN SATURATION: 98 % | SYSTOLIC BLOOD PRESSURE: 163 MMHG | RESPIRATION RATE: 18 BRPM | TEMPERATURE: 98 F | DIASTOLIC BLOOD PRESSURE: 89 MMHG | HEART RATE: 79 BPM | WEIGHT: 169.98 LBS | HEIGHT: 65 IN

## 2022-03-22 DIAGNOSIS — L02.511 CUTANEOUS ABSCESS OF RIGHT HAND: ICD-10-CM

## 2022-03-22 DIAGNOSIS — L03.011 CELLULITIS OF RIGHT FINGER: ICD-10-CM

## 2022-03-22 DIAGNOSIS — M71.341 OTHER BURSAL CYST, RIGHT HAND: ICD-10-CM

## 2022-03-22 DIAGNOSIS — I10 ESSENTIAL (PRIMARY) HYPERTENSION: ICD-10-CM

## 2022-03-22 PROCEDURE — 99284 EMERGENCY DEPT VISIT MOD MDM: CPT

## 2022-03-22 PROCEDURE — 99282 EMERGENCY DEPT VISIT SF MDM: CPT

## 2022-03-22 RX ORDER — CEPHALEXIN 500 MG
1 CAPSULE ORAL
Qty: 40 | Refills: 0
Start: 2022-03-22 | End: 2022-03-31

## 2022-03-22 NOTE — ED PROVIDER NOTE - CARE PROVIDER_API CALL
Ilan Hodge)  Orthoped[facility]  [address] [place] [address]  Phone: [phone]  Fax: ()-  Follow Up Time:

## 2022-03-22 NOTE — ED PROVIDER NOTE - PATIENT PORTAL LINK FT
You can access the FollowMyHealth Patient Portal offered by [facility] by registering at the following website: [email or web address]. By joining [employer]’s FollowMyHealth portal, you will also be able to view your health information using other applications (apps) compatible with our system.

## 2022-03-22 NOTE — ED PROVIDER NOTE - PHYSICAL EXAMINATION
MSK: Tenderness, warmth, and erythema tracking from cuticle up to the DIP crease dorsally of left ring finger. Too small to punctate pustules. No felon. No tenderness along flexor tendon sheath. No significant pain with passive extension. No circumferential swelling. No vesicles. GENERAL: NAD  HEAD:  EYE: anicteric  ENT:  CV:  RRR  PULM: normal work of breathing  GI:  :  SKIN: normal color and turgor  MSK: Tenderness, warmth, and erythema tracking from cuticle up to the DIP crease dorsally of left ring finger. Too small to punctate pustules. No felon. No tenderness along flexor tendon sheath. No significant pain with passive extension. No circumferential swelling. No vesicles.  NEURO: alert/oriented, clear speech. DICKEY. normal gait.

## 2022-03-22 NOTE — ED PROVIDER NOTE - NSFOLLOWUPINSTRUCTIONS_ED_ALL_ED_FT
Rest and elevate the hand.  Warm soaks for 15 minutes 3-4 times/day.  Take Keflex (cephalexin) as prescribed.  Follow up with Dr Hodge this Thursday or Friday - call today to schedule.    Return to the Emergency Department if you have any new or worsening symptoms, or if you have any concerns.  ========================    Paronychia    WHAT YOU NEED TO KNOW:    Paronychia is an infection of your nail fold caused by bacteria or a fungus. The nail fold is the skin around your nail. Paronychia may happen suddenly and last for 6 weeks or longer. You may have paronychia on more than 1 finger or toe.    DISCHARGE INSTRUCTIONS:    Medicines:   •Td vaccine is a booster shot used to help prevent tetanus and diphtheria. The Td booster may be given to adolescents and adults every 10 years or for certain wounds and injuries.      •Antibiotics: This medicine will help fight or prevent an infection. It may be given as a pill, cream, or ointment.      •Steroids: This medicine will help decrease inflammation. It may be given as a pill, cream, or ointment.      •Antifungal medicine: This medicine helps kill fungus that may be causing your infection. It may be given as a cream or ointment.      •NSAIDs: These medicines decrease pain and swelling. NSAIDs are available without a doctor's order. Ask your healthcare provider which medicine is right for you. Ask how much to take and when to take it. Take as directed. NSAIDs can cause stomach bleeding and kidney problems if not taken correctly.      •Take your medicine as directed. Contact your healthcare provider if you think your medicine is not helping or if you have side effects. Tell him of her if you are allergic to any medicine. Keep a list of the medicines, vitamins, and herbs you take. Include the amounts, and when and why you take them. Bring the list or the pill bottles to follow-up visits. Carry your medicine list with you in case of an emergency.      Follow up with your doctor as directed: Write down your questions so you remember to ask them during your visits.     Self-care:   •Soak your nail: Soak your nail in a mixture of equal parts vinegar and water 3 or 4 times each day. This will help decrease inflammation.      •Apply a warm compress: Soak a washcloth in warm water and place it on your nail. This will help decrease inflammation.       •Elevate: Raise your nail above the level of your heart as often as you can. This will help decrease swelling and pain. Prop your nail on pillows or blankets to keep it elevated comfortably.       •Use lotion: Apply lotion after you wash your hands. This will prevent your skin from becoming too dry.       Prevent paronychia:   •Avoid chemicals and allergens that may harm your skin and nails. This includes soaps, laundry detergents, and nail products.      •Keep your nails clean and dry. Avoid soaking your nails in water. Use cotton-lined rubber gloves or wear 2 rubber gloves if you work with food or water. The gloves will help protect your nail folds.      •Keep your nails short. Do not bite your nails, pick at your hangnails, suck your fingers, or wear fake nails. Bring your own nail tools when you go to the nail salon.      Contact your healthcare provider if:   •Your nail becomes loose, deformed, or falls off.      •You have a large abscess on your nail.      •You have questions or concerns about your condition or care.      Return to the emergency department if:   •You have severe nail pain.      •The inflammation spreads to your hand or arm.

## 2022-03-22 NOTE — ED ADULT TRIAGE NOTE - CHIEF COMPLAINT QUOTE
Pt presents to ED C/O L ring finger pain and swelling S/P lancing abscess at home. Pt states, " I've had this abscess for about a week but I've had other's in the past that have been lanced by the Dr.". Pt denies fevers.

## 2022-03-22 NOTE — ED PROVIDER NOTE - OBJECTIVE STATEMENT
63 y/o F right hand dominant had an abscess to left ring finger drained by a hand surgeon approximately 3 months ago and has developed a recurrent red, tender, and swollen area to distal phalanx. Last week, pt attempted to drain the area herself with a needle. Since then, the dorsal aspect of the distal phalanx has become more tender and red with 2 small pustules. No pain or swelling over palmar aspect of finger. No redness or pain proximal to DIP joint. No tenderness tracking up flexor tendon sheath of finger or palm. Denies any history of trauma to area, fevers, or chills. Was last on abx a few months ago after a face lift.

## 2022-03-22 NOTE — ED PROVIDER NOTE - NS ED ROS FT
CONSTITUTIONAL: No fever, chills, or weakness  NEURO: No headache, no dizziness, no syncope; No focal weakness/tingling/numbness  EYES: No visual changes  ENT: No rhinorrhea or sore throat  PULM: No cough or dyspnea  CV: No chest pain or palpitations  GI: No abdominal pain, vomiting, or diarrhea  : No dysuria, hematuria, frequency  MSK: No neck pain or back pain, no joint pain +finger pain, redness, swelling  SKIN: no rash or unusual bruising

## 2022-03-22 NOTE — CONSULT NOTE ADULT - SUBJECTIVE AND OBJECTIVE BOX
Pt Name: ZAINAB CAMARILLO  MRN: [id number]      Pt is a 61yo Female c/o  left 4th finger pain and drainage. Pt. reports she had pustules a few months ago that came on left 4th finger in which she had it drained by Dr. Hodge. As of recent the pustules came back 1 week ago and she lanced them herself with a needle. "Viscous fluid came out" and she soaked finger in peroxide/soap and water. Pt. reports redness/pain became worse, started to spread up finger so decided to come to ER today. Pt. reports she did not follow up with Dr. Hodge "because she did not want to wait to get an appointment." Pt. is right hand dominant. Denies any fever/chills/n/v/sob/cp. Pt. reports having a face lift 6w and has been on abx since then. Denies any trauma to left hand.       ROS is otherwise negative.    PAST MEDICAL & SURGICAL HISTORY:  Sciatica  HTN (hypertension)  Obesity        Allergies: No Known Allergies        Gen: NAD, A&O x3  Neurological: no focal deficits  Skin: Left UE hand- 4th finger severe erythema at distal phalanx with cyst x 2 (1 looks drained from being lanced) at dorsal region of distal phalanx. mild swelling of distal phalanx.  No ecchymosis  Musculoskeletal:  mild ttp at distal phalanx, / Finger Int 4/5 Biceps/Triceps/ Deltoids/ Wrist Flex/Wrist ext 5/5  Brachial/Radial Pulses 2+  SLT intact M/U/R  ROM: left MCP/PIP/DIP wnl of digits 1-3 and 5. DIP limited 2/2 to pain and swelling of 4th finger. Able to extend but has difficulty with flexion of DIP joint 4th finger.          A/P:  Pt is a 61yo Female  with left 4th finger paronychia with infected mucus cyst  -D/W Dr. Hodge  -can be dc on po abx (keflex) with bid soaks with dilute hydrogen peroxide  -f/u as outpt next week  -wbat left UE

## 2022-03-22 NOTE — ED PROVIDER NOTE - CLINICAL SUMMARY MEDICAL DECISION MAKING FREE TEXT BOX
Paronychia with cellulitis of dorsal aspect of left ring finger.  No felon or other drainable collection. No evidence of infection involving palmar side of finger or flexor tenosynovitis.  Ortho/hand consulted.

## 2022-03-23 ENCOUNTER — APPOINTMENT (OUTPATIENT)
Dept: PHYSICAL MEDICINE AND REHAB | Facility: CLINIC | Age: 62
End: 2022-03-23
Payer: MEDICAID

## 2022-03-23 VITALS — WEIGHT: 170 LBS | BODY MASS INDEX: 28.32 KG/M2 | HEIGHT: 65 IN

## 2022-03-23 PROCEDURE — 20610 DRAIN/INJ JOINT/BURSA W/O US: CPT | Mod: 50

## 2022-03-24 ENCOUNTER — NON-APPOINTMENT (OUTPATIENT)
Age: 62
End: 2022-03-24

## 2022-03-30 ENCOUNTER — APPOINTMENT (OUTPATIENT)
Dept: PHYSICAL MEDICINE AND REHAB | Facility: CLINIC | Age: 62
End: 2022-03-30
Payer: MEDICAID

## 2022-03-30 VITALS — RESPIRATION RATE: 16 BRPM | HEIGHT: 65 IN | WEIGHT: 170 LBS | BODY MASS INDEX: 28.32 KG/M2

## 2022-03-30 PROCEDURE — 20610 DRAIN/INJ JOINT/BURSA W/O US: CPT | Mod: 50

## 2022-03-31 ENCOUNTER — APPOINTMENT (OUTPATIENT)
Dept: ORTHOPEDIC SURGERY | Facility: CLINIC | Age: 62
End: 2022-03-31
Payer: MEDICAID

## 2022-03-31 PROCEDURE — 99213 OFFICE O/P EST LOW 20 MIN: CPT

## 2022-04-06 ENCOUNTER — APPOINTMENT (OUTPATIENT)
Dept: PHYSICAL MEDICINE AND REHAB | Facility: CLINIC | Age: 62
End: 2022-04-06
Payer: MEDICAID

## 2022-04-06 VITALS — HEIGHT: 65 IN | RESPIRATION RATE: 16 BRPM | BODY MASS INDEX: 28.32 KG/M2 | WEIGHT: 170 LBS

## 2022-04-06 PROCEDURE — 20610 DRAIN/INJ JOINT/BURSA W/O US: CPT | Mod: 50

## 2022-04-20 ENCOUNTER — TRANSCRIPTION ENCOUNTER (OUTPATIENT)
Age: 62
End: 2022-04-20

## 2022-05-06 ENCOUNTER — RX RENEWAL (OUTPATIENT)
Age: 62
End: 2022-05-06

## 2022-07-07 ENCOUNTER — APPOINTMENT (OUTPATIENT)
Dept: ORTHOPEDIC SURGERY | Facility: CLINIC | Age: 62
End: 2022-07-07
Payer: MEDICAID

## 2022-07-07 PROCEDURE — 99213 OFFICE O/P EST LOW 20 MIN: CPT

## 2022-07-07 PROCEDURE — 73562 X-RAY EXAM OF KNEE 3: CPT | Mod: 50

## 2022-07-07 PROCEDURE — 99203 OFFICE O/P NEW LOW 30 MIN: CPT

## 2022-07-07 NOTE — PHYSICAL EXAM
[de-identified] : Bilateral knee\par \par Constitutional: \par The patient is healthy-appearing and in no apparent distress. \par \par Gait:\par The patient ambulates with a normal gait and no limp.\par \par Cardiovascular System: \par The capillary refill is less than 2 seconds. \par \par Skin: \par There are no skin abnormalities.\par \par Bilateral Knee: \par \par Bony Palpation: \par There is no tenderness of the medial joint line. \par There is no tenderness of the lateral joint line.\par There is no tenderness of the medial femoral chondyle.\par There is no tenderness of the lateral femoral chondyle.\par There is no tenderness of the tibial tubercle.\par There is no tenderness of the superior patella.\par There is no tenderness of the inferior patella.\par There is tenderness of the medial patellar facet.\par There is tenderness of the lateral patellar facet.\par \par Soft Tissue Palpation: \par There is no tenderness of the medial retinaculum.\par There is no tenderness of the lateral retinaculum.\par There is no tenderness of the quadriceps tendon.\par There is no tenderness of the patella tendon.\par There is no tenderness of the ITB.\par There is no tenderness of the pes anserine.\par \par Active Range of Motion: \par The range of motion at the knee actively and passively is full. \par \par Special Tests: \par There is a negative Apley.\par There is a negative Steinmanns. \par There is a negative Lachman and Anterior Drawer.\par There is a negative Posterior Drawer.  \par There is no varus or valgus laxity.\par \par Strength: \par There is 4/5 hip flexion and 5/5 knee flexion and extension.  \par \par Psychiatric: \par The patient demonstrates a normal mood and affect and is active and alert.\par  [de-identified] : Given patient's reported history and physical examination, x-ray evaluation ( as listed below ) was ordered and performed to aid in diagnosis and treatment of the patient.\par X-ray bilateral knee.  There is moderate to severe medial and patellofemoral arthritis

## 2022-07-07 NOTE — ASSESSMENT
[FreeTextEntry1] : Discussed at length with patient exam history and imaging and severity of arthritis in particular.  Treatment options reviewed at this time patient elects home exercises and prescription pain medication anti-inflammatory.  Offered formal physical therapy but declined.  Discussed consideration to cortisone injection however given that she's recently had some Visco supplementation I informed her that the cortisone injection may limit some of the Visco supplementation and benefits and she declines any injection at this time.  Patient was ultimately given the underlying arthritis she may require total knee replacement for pain relief

## 2022-07-07 NOTE — HISTORY OF PRESENT ILLNESS
[de-identified] : Patient is a new patient presenting with bilateral knee pain right worse than left her several years.  She reports having cortisone injections as well as Visco supplementation with the most recent being 6 weeks ago with no significant improvement in the right.  She denies any fall or trauma

## 2022-07-11 ENCOUNTER — APPOINTMENT (OUTPATIENT)
Dept: PHYSICAL MEDICINE AND REHAB | Facility: CLINIC | Age: 62
End: 2022-07-11

## 2022-08-01 ENCOUNTER — RX RENEWAL (OUTPATIENT)
Age: 62
End: 2022-08-01

## 2022-08-31 ENCOUNTER — EMERGENCY (EMERGENCY)
Facility: HOSPITAL | Age: 62
LOS: 1 days | Discharge: HOME | End: 2022-08-31
Admitting: STUDENT IN AN ORGANIZED HEALTH CARE EDUCATION/TRAINING PROGRAM
Payer: MEDICAID

## 2022-08-31 VITALS
DIASTOLIC BLOOD PRESSURE: 102 MMHG | OXYGEN SATURATION: 97 % | TEMPERATURE: 98 F | SYSTOLIC BLOOD PRESSURE: 164 MMHG | WEIGHT: 169.98 LBS | HEIGHT: 65 IN | RESPIRATION RATE: 18 BRPM | HEART RATE: 87 BPM

## 2022-08-31 PROCEDURE — 99284 EMERGENCY DEPT VISIT MOD MDM: CPT | Mod: 25

## 2022-08-31 PROCEDURE — 99283 EMERGENCY DEPT VISIT LOW MDM: CPT

## 2022-08-31 PROCEDURE — 73564 X-RAY EXAM KNEE 4 OR MORE: CPT | Mod: 26

## 2022-08-31 PROCEDURE — 73564 X-RAY EXAM KNEE 4 OR MORE: CPT | Mod: 26,RT

## 2022-08-31 PROCEDURE — 73564 X-RAY EXAM KNEE 4 OR MORE: CPT

## 2022-08-31 PROCEDURE — 96372 THER/PROPH/DIAG INJ SC/IM: CPT

## 2022-08-31 RX ORDER — KETOROLAC TROMETHAMINE 30 MG/ML
15 SYRINGE (ML) INJECTION ONCE
Refills: 0 | Status: DISCONTINUED | OUTPATIENT
Start: 2022-08-31 | End: 2022-08-31

## 2022-08-31 RX ADMIN — Medication 15 MILLIGRAM(S): at 07:47

## 2022-08-31 NOTE — ED PROVIDER NOTE - NS ED ROS FT
CONSTITUTIONAL: Denies fever and chills    RESPIRATORY: Denies SOB and cough.    CARDIOVASCULAR: Denies palpitations and chest pain.    MUSCULOSKELETAL: b/l knee pain    SKIN: Denies rash and pruritus.    NEUROLOGICAL: Denies numbness/tingling/weakness to extremities

## 2022-08-31 NOTE — ED PROVIDER NOTE - PHYSICAL EXAMINATION
CONSTITUTIONAL: Awake, alert.  Nontoxic, no acute distress.    HEAD: Normocephalic, atraumatic.    HEART:  Normal rate, regular rhythm.  Heart sounds S1, S2.  No murmurs, rubs or gallops.    LUNGS:  No acute respiratory distress.  Non-tachypneic and non-labored.  Lungs are clear bilaterally with good aeration.  No wheezing, rales, rhonchi.    MUSCULOSKELETAL:  Normal appearing extremities without obvious deformity, rash, ecchymosis, erythema.  no obvious effusion, no joint laxity. Warm.  No focal ttp to L knee, +ttp to medial aspect R knee.  FROM to L knee.  Able to fully extend R knee, can flex to 90 degrees, 5/5 strength b/l lower ext.  Sensation and motor function grossly intact.  Strong equal DP pulses b/l.   Cap refill < 2 b/l upper and lower ext.  All compartments soft.    SKIN: Skin in warm, dry and intact without rashes or lesions.  Appropriate color for ethnicity.    NEUROLOGICAL:  Patient is alert, oriented x person, place and time.    PSYCH: Appropriate mood and affect. Good judgment and insight.

## 2022-08-31 NOTE — ED PROVIDER NOTE - NSFOLLOWUPINSTRUCTIONS_ED_ALL_ED_FT
Thank you for visiting [facility] Emergency Department.      We saw you today for knee pain.    PAIN CONTROL:   You may take ibuprofen (Motrin, Advil) 600 mg (3 regular tablets) every 6 hours as needed for pain.  Please take with food.  Stop taking if you develop abdominal pain, dark/ bloody stools.  Do not mix with other NSAIDS (ie. Naproxen, Aleve, Celecoxib).  You may also take acetaminophen (Tylenol) 650-975mg (2-3 regular tablets) or 500-1000mg (1-2 extra strength tablets) every 6 hours as needed for pain.  Do not exceed 4000 mg in 1 day. These medications may be bought over the counter.    I recommend alternating the Ibuprofen and Tylenol so you are getting medications around the clock.  For example take the Ibuprofen, then 3 hours later take the Tylenol, then 3 hours later take the Ibuprofen, and repeat as needed.    Rest. Apply ice to affected area 20 minutes on, then 20 minutes off.  You may repeat throughout the day.  Please wear ACE wrap as instructed. Elevate affected extremity.    If your pain does not improve or worsens despite these measures, you should seek medical attention and/or will need to followup with an orthopedist.    Please know that no emergency visit is complete without follow-up with your primary care provider in 1 week.  Please bring copies of all discharge papers and results and show to your doctor.      Please continue taking all previous medications as instructed unless we discussed otherwise.     I appreciated your patience and hope you feel better soon.     Return to ER immediately if you develop fevers, chills, chest pain, shortness of breath, worsening and/or any concerning symptoms.

## 2022-08-31 NOTE — ED PROVIDER NOTE - PATIENT PORTAL LINK FT
You can access the FollowMyHealth Patient Portal offered by [facility] by registering at the following website: [email or web address]. By joining [employer]’s FollowMyHealth portal, you will also be able to view your health information using other applications (apps) compatible with our system.

## 2022-08-31 NOTE — ED PROVIDER NOTE - CLINICAL SUMMARY MEDICAL DECISION MAKING FREE TEXT BOX
63 y/o female w/ hx htn p/w b/l knee pain (R>L) x 6 wks after knees being hyperflexed during massage.  R knee worsening over past 2 wks  Had xrays outpatient which reportedly showed arthritis  L knee exam reassuring, R knee with decreased ROM and ttp to medial aspect patella  Will defer xrays to L knee at this time.  Will get xray R knee  Provide pain control  Discussed with pt that will need to go back to ortho for definitive care - pt understands  Will give ace wrap, nsaids/tylenol for pain, rice  Offered crutches, which pt currently declining

## 2022-08-31 NOTE — ED PROVIDER NOTE - CARE PROVIDER_API CALL
Jericho Brannon)  Orthopaedic Surgery Surgery  [address] [place] [address]  Phone: [phone]  Fax: [phone]  Follow Up Time:

## 2022-08-31 NOTE — ED ADULT NURSE NOTE - OBJECTIVE STATEMENT
Pt is 62F c/o bilateral knee pain and difficulty bearing weight s/p stretching massage x6 wks ago, pt states "she stretched my legs back and I felt it rip." Pt describes passive knee flexion while lying on stomach. Pt states unrelieved with rest, NSAIDs. Pt describes pain with leg bending, states no pain with leg extension.
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
## 2022-09-02 DIAGNOSIS — M19.90 UNSPECIFIED OSTEOARTHRITIS, UNSPECIFIED SITE: ICD-10-CM
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
## 2022-09-02 DIAGNOSIS — M25.561 PAIN IN RIGHT KNEE: ICD-10-CM

## 2022-09-02 DIAGNOSIS — I10 ESSENTIAL (PRIMARY) HYPERTENSION: ICD-10-CM

## 2022-09-02 DIAGNOSIS — M25.562 PAIN IN LEFT KNEE: ICD-10-CM

## 2022-09-16 ENCOUNTER — APPOINTMENT (OUTPATIENT)
Dept: PHYSICAL MEDICINE AND REHAB | Facility: CLINIC | Age: 62
End: 2022-09-16

## 2022-09-16 VITALS — BODY MASS INDEX: 28.32 KG/M2 | HEIGHT: 65 IN | WEIGHT: 170 LBS

## 2022-09-16 PROCEDURE — 20610 DRAIN/INJ JOINT/BURSA W/O US: CPT | Mod: 50

## 2022-09-16 PROCEDURE — 99214 OFFICE O/P EST MOD 30 MIN: CPT | Mod: 25

## 2022-09-16 NOTE — DATA REVIEWED
[Plain X-Rays] : plain X-Rays [FreeTextEntry1] : XR KNEE 8/2022: Medial and patellofemoral compartment degenerative changes. Left>Right.

## 2022-09-16 NOTE — ASSESSMENT
[FreeTextEntry1] : Impression:\par 1. Bilateral Knee DJD\par \par Plan: The history, physical examination, and imaging were reviewed. The imaging results and diagnosis were discussed with the patient along with treatment options. The patient is interested in interventional options for symptom reduction; CSI was performed in the office today, please see procedure note for full detail. I am recommending that the patient resume her regular exercise program. We emphasized the importance of low impact activities. We will submit for repeat Euflexxa. The patient will follow up in 4 weeks. The patient expressed verbal understanding and is in agreement with the plan of care. All of the patient's questions and concerns were addressed during today's visit.\par

## 2022-09-16 NOTE — PROCEDURE
[de-identified] : Procedure: Intra-articular BILATERAL Knee Joint Steroid & Anesthetic Injection \par \par Findings: The RIGHT & LEFT knee present with medial/lateral joint line tenderness. XRays reveal tricompartment degenerative joint disease. \par \par Injectate: 1 mL Kenalog (40mg/mL) with 4mL 1% Lidocaine\par \par After risks, benefits and alternatives were discussed and the patient expressed understanding, informed consent was obtained. Risks include but are not limited to bleeding, infection, worsening pain, nerve damage, scar formation. \par \par The RIGHT anterior inferomedial area was marked and prepped with Chloraprep.  Using sterile technique, a 25G 1.5 inch needle was advanced into the joint space and after negative aspiration for blood or synovial fluid, the above mentioned injectate was administered without any resistance. The above procedure was then repeated for the LEFT knee. \par \par At the conclusion of the procedure the needles were withdrawn.  Direct pressure was applied to the area.  A Band-Aid was used to cover the injection site.  There were no complications during or after the procedure.  The patient tolerated the procedure well and reported improvement in symptoms following administration of the injectate.  Post procedure instructions and follow up appointment were given. If there are any complications, the patient was instructed to call the office.

## 2022-09-16 NOTE — HISTORY OF PRESENT ILLNESS
[FreeTextEntry1] : Ms. ZAINAB CAMARILLO is a very pleasant 61 year female who comes in for follow up of BILATERAL KNEE Pain. She was doing well after Euflexxa injections in April 2022. Recently she went for a massage where they manipulated her knees and this significantly aggravated her condition. She has been taking NSAIDs without relief. She otherwise had excellent and sustained relief with Viscosupplementation over the past 6 months. Currently pain is located primarily on BILATERAL KNEES intermittent and described as achy. The pain is rated as 8-10/10 and ranges from 10/10. The patient's symptoms are aggravated by Cold and alleviated by Heat. The patient denies any night pain, numbness/tingling, weakness, or bowel/bladder dysfunction. The patient has no other complaints at this time.\par

## 2022-09-17 PROBLEM — Z23 ENCOUNTER FOR IMMUNIZATION: Status: RESOLVED | Noted: 2021-07-27 | Resolved: 2022-09-17

## 2022-09-17 PROBLEM — Z86.69 HISTORY OF EAR PAIN: Status: RESOLVED | Noted: 2021-11-04 | Resolved: 2022-09-17

## 2022-09-19 ENCOUNTER — TRANSCRIPTION ENCOUNTER (OUTPATIENT)
Age: 62
End: 2022-09-19

## 2022-09-19 PROBLEM — R22.32 MASS OF FINGER OF LEFT HAND: Status: RESOLVED | Noted: 2021-11-19 | Resolved: 2022-09-19

## 2022-09-19 PROBLEM — G89.29 ALTERATION IN COMFORT DUE TO CHRONIC PAIN: Status: RESOLVED | Noted: 2021-11-12 | Resolved: 2022-09-19

## 2022-09-19 PROBLEM — L70.9 ADULT ACNE: Status: RESOLVED | Noted: 2021-02-18 | Resolved: 2022-09-19

## 2022-09-19 PROBLEM — B00.1 RECURRENT COLD SORES: Status: RESOLVED | Noted: 2020-05-13 | Resolved: 2022-09-19

## 2022-09-19 PROBLEM — M79.646 PAIN OF FINGER, UNSPECIFIED LATERALITY: Status: RESOLVED | Noted: 2022-03-24 | Resolved: 2022-09-19

## 2022-09-19 PROBLEM — M79.644 BILATERAL THUMB PAIN: Status: RESOLVED | Noted: 2021-11-30 | Resolved: 2022-09-19

## 2022-09-19 PROBLEM — R79.89 LFT ELEVATION: Status: RESOLVED | Noted: 2021-08-02 | Resolved: 2022-09-19

## 2022-09-19 PROBLEM — M25.50 MULTIPLE JOINT PAIN: Status: RESOLVED | Noted: 2021-11-02 | Resolved: 2022-09-19

## 2022-09-19 PROBLEM — Q83.3 ACCESSORY NIPPLE: Status: RESOLVED | Noted: 2019-10-21 | Resolved: 2022-09-19

## 2022-09-20 ENCOUNTER — NON-APPOINTMENT (OUTPATIENT)
Age: 62
End: 2022-09-20

## 2022-09-20 ENCOUNTER — APPOINTMENT (OUTPATIENT)
Dept: INTERNAL MEDICINE | Facility: CLINIC | Age: 62
End: 2022-09-20

## 2022-09-20 VITALS
HEART RATE: 83 BPM | HEIGHT: 65 IN | SYSTOLIC BLOOD PRESSURE: 141 MMHG | BODY MASS INDEX: 30.49 KG/M2 | OXYGEN SATURATION: 98 % | TEMPERATURE: 97.9 F | DIASTOLIC BLOOD PRESSURE: 76 MMHG | WEIGHT: 183 LBS

## 2022-09-20 VITALS — SYSTOLIC BLOOD PRESSURE: 138 MMHG | DIASTOLIC BLOOD PRESSURE: 74 MMHG

## 2022-09-20 DIAGNOSIS — K76.0 FATTY (CHANGE OF) LIVER, NOT ELSEWHERE CLASSIFIED: ICD-10-CM

## 2022-09-20 DIAGNOSIS — Z87.39 PERSONAL HISTORY OF OTHER DISEASES OF THE MUSCULOSKELETAL SYSTEM AND CONNECTIVE TISSUE: ICD-10-CM

## 2022-09-20 DIAGNOSIS — H81.10 BENIGN PAROXYSMAL VERTIGO, UNSPECIFIED EAR: ICD-10-CM

## 2022-09-20 PROCEDURE — 99214 OFFICE O/P EST MOD 30 MIN: CPT | Mod: 25

## 2022-09-20 PROCEDURE — 36415 COLL VENOUS BLD VENIPUNCTURE: CPT

## 2022-09-20 RX ORDER — CEPHALEXIN 500 MG/1
500 TABLET ORAL 3 TIMES DAILY
Qty: 15 | Refills: 0 | Status: DISCONTINUED | COMMUNITY
Start: 2022-04-20 | End: 2022-09-20

## 2022-09-20 RX ORDER — DICLOFENAC SODIUM 1% 10 MG/G
1 GEL TOPICAL
Qty: 100 | Refills: 6 | Status: DISCONTINUED | COMMUNITY
Start: 2021-11-04 | End: 2022-09-20

## 2022-09-20 RX ORDER — NABUMETONE 500 MG/1
500 TABLET, FILM COATED ORAL
Qty: 60 | Refills: 0 | Status: DISCONTINUED | COMMUNITY
Start: 2022-07-07 | End: 2022-09-20

## 2022-09-20 RX ORDER — TRETINOIN 0.5 MG/G
0.05 CREAM TOPICAL
Qty: 20 | Refills: 0 | Status: DISCONTINUED | COMMUNITY
Start: 2020-05-14 | End: 2022-09-20

## 2022-09-20 RX ORDER — NEOMYCIN SULFATE, POLYMYXIN B SULFATE, HYDROCORTISONE 3.5; 10000; 1 MG/ML; [USP'U]/ML; MG/ML
1 SOLUTION/ DROPS AURICULAR (OTIC) 4 TIMES DAILY
Qty: 1 | Refills: 0 | Status: DISCONTINUED | COMMUNITY
Start: 2021-11-04 | End: 2022-09-20

## 2022-09-20 NOTE — PHYSICAL EXAM
[No Acute Distress] : no acute distress [Normal Sclera/Conjunctiva] : normal sclera/conjunctiva [Clear to Auscultation] : lungs were clear to auscultation bilaterally [Normal Rate] : normal rate  [Regular Rhythm] : with a regular rhythm [No Edema] : there was no peripheral edema [Soft] : abdomen soft [Non Tender] : non-tender [No CVA Tenderness] : no CVA  tenderness [No Rash] : no rash [Normal] : affect was normal and insight and judgment were intact [de-identified] : Thyromegaly  [de-identified] : declined exam- "I dont want knee pain aggrevated"

## 2022-09-20 NOTE — HISTORY OF PRESENT ILLNESS
[FreeTextEntry1] : 1. Pt is here for 3 months F/u for review of medical problems including HTN \par 2. completed one J&J only, no booster \par 3. referral

## 2022-09-21 ENCOUNTER — APPOINTMENT (OUTPATIENT)
Dept: DERMATOLOGY | Facility: CLINIC | Age: 62
End: 2022-09-21

## 2022-09-21 ENCOUNTER — TRANSCRIPTION ENCOUNTER (OUTPATIENT)
Age: 62
End: 2022-09-21

## 2022-09-21 LAB
ALBUMIN SERPL ELPH-MCNC: 4.9 G/DL
ALP BLD-CCNC: 62 U/L
ALT SERPL-CCNC: 15 U/L
ANION GAP SERPL CALC-SCNC: 16 MMOL/L
AST SERPL-CCNC: 15 U/L
BILIRUB SERPL-MCNC: 0.4 MG/DL
BUN SERPL-MCNC: 13 MG/DL
CALCIUM SERPL-MCNC: 10.1 MG/DL
CHLORIDE SERPL-SCNC: 105 MMOL/L
CHOLEST SERPL-MCNC: 255 MG/DL
CO2 SERPL-SCNC: 20 MMOL/L
CREAT SERPL-MCNC: 0.68 MG/DL
EGFR: 97 ML/MIN/1.73M2
ESTIMATED AVERAGE GLUCOSE: 128 MG/DL
FOLATE SERPL-MCNC: 11.1 NG/ML
GLUCOSE SERPL-MCNC: 102 MG/DL
HBA1C MFR BLD HPLC: 6.1 %
HDLC SERPL-MCNC: 92 MG/DL
LDLC SERPL CALC-MCNC: 138 MG/DL
NONHDLC SERPL-MCNC: 164 MG/DL
POTASSIUM SERPL-SCNC: 4.4 MMOL/L
PROT SERPL-MCNC: 7.6 G/DL
SODIUM SERPL-SCNC: 141 MMOL/L
TRIGL SERPL-MCNC: 126 MG/DL
VIT B12 SERPL-MCNC: 846 PG/ML

## 2022-09-21 PROCEDURE — 17110 DESTRUCTION B9 LES UP TO 14: CPT

## 2022-09-21 PROCEDURE — 99214 OFFICE O/P EST MOD 30 MIN: CPT | Mod: 25

## 2022-09-21 NOTE — HISTORY OF PRESENT ILLNESS
[FreeTextEntry1] : spots, refills  [de-identified] : hx hsv, sks\par estab doretha/summe\par nail infection from manicure, no fever or chills\par needs valtrex, retin a refills\par irritated spots chest (recent co2 but did nto go away) and L thigh

## 2022-09-21 NOTE — PHYSICAL EXAM
[Alert] : alert [Oriented x 3] : ~L oriented x 3 [Well Nourished] : well nourished [Conjunctiva Non-injected] : conjunctiva non-injected [No Visual Lymphadenopathy] : no visual  lymphadenopathy [No Clubbing] : no clubbing [No Edema] : no edema [No Bromhidrosis] : no bromhidrosis [No Chromhidrosis] : no chromhidrosis [FreeTextEntry3] : stuck on cerebriform papule w purpura L thigh\par thin stuck on skin colored papule medial chest \par L 4th finger nail fold erythema and tenderness

## 2022-09-22 ENCOUNTER — NON-APPOINTMENT (OUTPATIENT)
Age: 62
End: 2022-09-22

## 2022-09-22 ENCOUNTER — APPOINTMENT (OUTPATIENT)
Dept: HEART AND VASCULAR | Facility: CLINIC | Age: 62
End: 2022-09-22

## 2022-09-22 ENCOUNTER — APPOINTMENT (OUTPATIENT)
Dept: OBGYN | Facility: CLINIC | Age: 62
End: 2022-09-22

## 2022-09-22 VITALS
BODY MASS INDEX: 30.33 KG/M2 | DIASTOLIC BLOOD PRESSURE: 81 MMHG | SYSTOLIC BLOOD PRESSURE: 165 MMHG | OXYGEN SATURATION: 97 % | TEMPERATURE: 98 F | WEIGHT: 182.06 LBS | HEART RATE: 83 BPM | HEIGHT: 65 IN

## 2022-09-22 VITALS — DIASTOLIC BLOOD PRESSURE: 88 MMHG | SYSTOLIC BLOOD PRESSURE: 179 MMHG | HEART RATE: 83 BPM

## 2022-09-22 VITALS — BODY MASS INDEX: 30.29 KG/M2 | SYSTOLIC BLOOD PRESSURE: 140 MMHG | WEIGHT: 182 LBS | DIASTOLIC BLOOD PRESSURE: 80 MMHG

## 2022-09-22 DIAGNOSIS — R74.8 ABNORMAL LEVELS OF OTHER SERUM ENZYMES: ICD-10-CM

## 2022-09-22 DIAGNOSIS — M17.12 UNILATERAL PRIMARY OSTEOARTHRITIS, LEFT KNEE: ICD-10-CM

## 2022-09-22 DIAGNOSIS — M79.645 PAIN IN RIGHT FINGER(S): ICD-10-CM

## 2022-09-22 DIAGNOSIS — R79.89 OTHER SPECIFIED ABNORMAL FINDINGS OF BLOOD CHEMISTRY: ICD-10-CM

## 2022-09-22 DIAGNOSIS — I10 ESSENTIAL (PRIMARY) HYPERTENSION: ICD-10-CM

## 2022-09-22 DIAGNOSIS — L70.9 ACNE, UNSPECIFIED: ICD-10-CM

## 2022-09-22 DIAGNOSIS — Z86.19 PERSONAL HISTORY OF OTHER INFECTIOUS AND PARASITIC DISEASES: ICD-10-CM

## 2022-09-22 DIAGNOSIS — M79.646 PAIN IN UNSPECIFIED FINGER(S): ICD-10-CM

## 2022-09-22 DIAGNOSIS — Z71.85 ENCOUNTER FOR IMMUNIZATION SAFETY COUNSELING: ICD-10-CM

## 2022-09-22 DIAGNOSIS — B00.1 HERPESVIRAL VESICULAR DERMATITIS: ICD-10-CM

## 2022-09-22 DIAGNOSIS — G89.29 OTHER CHRONIC PAIN: ICD-10-CM

## 2022-09-22 DIAGNOSIS — L82.0 INFLAMED SEBORRHEIC KERATOSIS: ICD-10-CM

## 2022-09-22 DIAGNOSIS — Z12.83 ENCOUNTER FOR SCREENING FOR MALIGNANT NEOPLASM OF SKIN: ICD-10-CM

## 2022-09-22 DIAGNOSIS — R22.32 LOCALIZED SWELLING, MASS AND LUMP, LEFT UPPER LIMB: ICD-10-CM

## 2022-09-22 DIAGNOSIS — M71.349 OTHER BURSAL CYST, UNSPECIFIED HAND: ICD-10-CM

## 2022-09-22 DIAGNOSIS — E65 LOCALIZED ADIPOSITY: ICD-10-CM

## 2022-09-22 DIAGNOSIS — L03.019 CELLULITIS OF UNSPECIFIED FINGER: ICD-10-CM

## 2022-09-22 DIAGNOSIS — Q83.3 ACCESSORY NIPPLE: ICD-10-CM

## 2022-09-22 DIAGNOSIS — M25.50 PAIN IN UNSPECIFIED JOINT: ICD-10-CM

## 2022-09-22 DIAGNOSIS — M79.644 PAIN IN RIGHT FINGER(S): ICD-10-CM

## 2022-09-22 DIAGNOSIS — M17.11 UNILATERAL PRIMARY OSTEOARTHRITIS, RIGHT KNEE: ICD-10-CM

## 2022-09-22 PROCEDURE — 93000 ELECTROCARDIOGRAM COMPLETE: CPT

## 2022-09-22 PROCEDURE — 99214 OFFICE O/P EST MOD 30 MIN: CPT | Mod: 25

## 2022-09-22 PROCEDURE — 99396 PREV VISIT EST AGE 40-64: CPT

## 2022-09-22 NOTE — HISTORY OF PRESENT ILLNESS
[Patient reported mammogram was normal] : Patient reported mammogram was normal [Patient reported PAP Smear was normal] : Patient reported PAP Smear was normal [postmenopausal] : postmenopausal [Post-Menopause, No Sxs] : post-menopausal, currently without symptoms [No] : Patient does not have concerns regarding sex [Previously active] : previously active [FreeTextEntry1] : Ms. Hanna Barron presents for an annual visit without complaints.\par \par Patient is 62 year old F G0 who presents to for an annual visit without complaints. Patient is currently on HRT. She would like to discontinue HRT it as she has been on it for 10 years and would like to wean off. Currently patient takes the HRT every other day and has been counseled on tapering down. \par \par Patient has noted weight gain in last 2 years but is motivated to take control of diet and exercise. Pt is on Amlodapine for hypertension. Patient has no FHx or SHx. She is not currently sexually active. Patient would like a referral for mammogram. Her last pap was 1 year ago and does not recall abnormal testing. Patient denies itching, burning, bleeding or pain with urination. \par \par  [N] : Patient is not sexually active [Mammogramdate] : 2/2021 [PapSmeardate] : 2/2021

## 2022-09-22 NOTE — DISCUSSION/SUMMARY
[FreeTextEntry1] : Patient is a 62 year old female, , who presents to the clinic with no complaints. The patient was counseled on how to discontinue HRT, and now will be switching from once every other day to once every third day for a month and then cessation. Patient was given a referral for mammogram. A pap smear, breast and pelvic exam were performed.

## 2022-09-23 LAB — HPV HIGH+LOW RISK DNA PNL CVX: NOT DETECTED

## 2022-09-27 LAB — CYTOLOGY CVX/VAG DOC THIN PREP: NORMAL

## 2022-10-10 ENCOUNTER — APPOINTMENT (OUTPATIENT)
Dept: PHYSICAL MEDICINE AND REHAB | Facility: CLINIC | Age: 62
End: 2022-10-10

## 2022-10-10 VITALS
TEMPERATURE: 96.6 F | BODY MASS INDEX: 30.49 KG/M2 | SYSTOLIC BLOOD PRESSURE: 151 MMHG | DIASTOLIC BLOOD PRESSURE: 88 MMHG | HEART RATE: 83 BPM | OXYGEN SATURATION: 98 % | RESPIRATION RATE: 16 BRPM | HEIGHT: 65 IN | WEIGHT: 183 LBS

## 2022-10-10 PROCEDURE — 20610 DRAIN/INJ JOINT/BURSA W/O US: CPT | Mod: 50

## 2022-10-17 ENCOUNTER — APPOINTMENT (OUTPATIENT)
Dept: PHYSICAL MEDICINE AND REHAB | Facility: CLINIC | Age: 62
End: 2022-10-17

## 2022-10-17 VITALS
TEMPERATURE: 97.2 F | HEIGHT: 65 IN | DIASTOLIC BLOOD PRESSURE: 85 MMHG | BODY MASS INDEX: 29.99 KG/M2 | SYSTOLIC BLOOD PRESSURE: 159 MMHG | HEART RATE: 93 BPM | WEIGHT: 180 LBS | OXYGEN SATURATION: 95 % | RESPIRATION RATE: 16 BRPM

## 2022-10-17 PROCEDURE — 20610 DRAIN/INJ JOINT/BURSA W/O US: CPT | Mod: 50

## 2022-10-24 ENCOUNTER — APPOINTMENT (OUTPATIENT)
Dept: PHYSICAL MEDICINE AND REHAB | Facility: CLINIC | Age: 62
End: 2022-10-24
Payer: MEDICAID

## 2022-10-24 VITALS
TEMPERATURE: 97.6 F | RESPIRATION RATE: 16 BRPM | DIASTOLIC BLOOD PRESSURE: 86 MMHG | HEART RATE: 89 BPM | WEIGHT: 183 LBS | HEIGHT: 65 IN | BODY MASS INDEX: 30.49 KG/M2 | SYSTOLIC BLOOD PRESSURE: 147 MMHG | OXYGEN SATURATION: 93 %

## 2022-10-24 PROCEDURE — 20610 DRAIN/INJ JOINT/BURSA W/O US: CPT | Mod: 50

## 2022-11-07 ENCOUNTER — APPOINTMENT (OUTPATIENT)
Dept: OPHTHALMOLOGY | Facility: CLINIC | Age: 62
End: 2022-11-07

## 2022-11-07 ENCOUNTER — NON-APPOINTMENT (OUTPATIENT)
Age: 62
End: 2022-11-07

## 2022-11-07 PROCEDURE — 92014 COMPRE OPH EXAM EST PT 1/>: CPT

## 2022-11-29 ENCOUNTER — APPOINTMENT (OUTPATIENT)
Dept: INTERNAL MEDICINE | Facility: CLINIC | Age: 62
End: 2022-11-29

## 2022-12-06 NOTE — ADDENDUM
[FreeTextEntry1] : 12/06/22: Ms. Barron is preoperative for right knee arthroplasty on 01/25/23 with Pablo Lu MD.  She does not have established atherosclerotic heart disease, she denies chest pain, and has 4 METS of exertional capacity.  She may, therefore, proceed with surgery without cardiac contraindications.

## 2022-12-06 NOTE — PHYSICAL EXAM
[General Appearance - Well Developed] : well developed [Normal Appearance] : normal appearance [Well Groomed] : well groomed [General Appearance - Well Nourished] : well nourished [No Deformities] : no deformities [General Appearance - In No Acute Distress] : no acute distress [Normal Conjunctiva] : the conjunctiva exhibited no abnormalities [Eyelids - No Xanthelasma] : the eyelids demonstrated no xanthelasmas [Normal Oral Mucosa] : normal oral mucosa [No Oral Pallor] : no oral pallor [No Oral Cyanosis] : no oral cyanosis [Normal Jugular Venous A Waves Present] : normal jugular venous A waves present [Normal Jugular Venous V Waves Present] : normal jugular venous V waves present [No Jugular Venous Moreno A Waves] : no jugular venous moreno A waves [Respiration, Rhythm And Depth] : normal respiratory rhythm and effort [Exaggerated Use Of Accessory Muscles For Inspiration] : no accessory muscle use [Auscultation Breath Sounds / Voice Sounds] : lungs were clear to auscultation bilaterally [Abdomen Soft] : soft [Abdomen Tenderness] : non-tender [Abdomen Mass (___ Cm)] : no abdominal mass palpated [Abnormal Walk] : normal gait [Gait - Sufficient For Exercise Testing] : the gait was sufficient for exercise testing [Nail Clubbing] : no clubbing of the fingernails [Cyanosis, Localized] : no localized cyanosis [Petechial Hemorrhages (___cm)] : no petechial hemorrhages [Skin Color & Pigmentation] : normal skin color and pigmentation [] : no rash [No Venous Stasis] : no venous stasis [Skin Lesions] : no skin lesions [No Skin Ulcers] : no skin ulcer [No Xanthoma] : no  xanthoma was observed [Oriented To Time, Place, And Person] : oriented to person, place, and time [Affect] : the affect was normal [Mood] : the mood was normal [No Anxiety] : not feeling anxious [Normal Rate] : normal [Normal S1] : normal S1 [Normal S2] : normal S2 [No Murmur] : no murmurs heard [2+] : left 2+ [No Abnormalities] : the abdominal aorta was not enlarged and no bruit was heard [No Pitting Edema] : no pitting edema present [FreeTextEntry1] : + overweight [S3] : no S3 [S4] : no S4 [Right Carotid Bruit] : no bruit heard over the right carotid [Left Carotid Bruit] : no bruit heard over the left carotid [Right Femoral Bruit] : no bruit heard over the right femoral artery [Left Femoral Bruit] : no bruit heard over the left femoral artery

## 2022-12-06 NOTE — ASSESSMENT
[FreeTextEntry1] : 1. HTN: BP not at ACC/AHA 2017 guideline target\par       - increase amlodipine from 5mg po qd to 10mg po qd  \par       - if BP remains above target next visit will up titrate anti-HTN regimen \par       - will send for an echocardiogram to rule out hypertensive heart disease\par \par 2. Overweight: BMI 30.3: + weight gain: \par      - we had an extensive discussion about therapeutic lifestyle changes to promote increased cardiovascular fitness and achieving goal weight\par \par 3. Dyslipidemia:  (09/21/22):\par     - discussed therapeutic lifestyle changes to promote improved lipid metabolism \par \par 4. Pre-DM2: A1c 6.1% (09/21/22): \par     - discussed with patient therapeutic lifestyle changes to improve glucose metabolism \par \par \par

## 2022-12-06 NOTE — REASON FOR VISIT
[Other: ____] : [unfilled] [FreeTextEntry1] : Diagnostic Tests:\par ------------------------------------\par ECG:\par 09/22/22: sinus rhythm, PRWP. \par 04/15/21: NSR, CLIVE, PRWP.\par 05/06/20: NSR, CLIVE, PRWP. \par 01/11/19: NSR, CLIVE. \par 01/16/18: NSR, CLIVE. \par 05/19/16: NSR, CLIVE. \par 02/19/16: NSR, CLIVE. \par -------------------------------------\par Echo:\par 06/04/20: EF 60%, normal study. \par --------------------------------------\par Sleep studies:\par 06/18/20: AHI 1, effectively no ANUM

## 2022-12-06 NOTE — HISTORY OF PRESENT ILLNESS
[FreeTextEntry1] : Ms. Barron presents for follow up and management of HTN, dyslipidemia, pre-DM2, and overweight.  She was followed by another cardiologist, Oscar Rogers MD for a number of years but wanted to establish care with a new cardiologist.   We had an extensive discussion about therapeutic lifestyle changes to promote increased cardiovascular fitness and achieving goal weight.  She had an echocardiogram on 06/04/20 which revealed an EF of 60% and was a normal study.  She has gained weight since last visit.  She was last seen by me in the office on 04/15/21.  At present, she denies chest pain.

## 2022-12-12 ENCOUNTER — TRANSCRIPTION ENCOUNTER (OUTPATIENT)
Age: 62
End: 2022-12-12

## 2023-01-16 ENCOUNTER — NON-APPOINTMENT (OUTPATIENT)
Age: 63
End: 2023-01-16

## 2023-01-18 ENCOUNTER — APPOINTMENT (OUTPATIENT)
Dept: HEART AND VASCULAR | Facility: CLINIC | Age: 63
End: 2023-01-18
Payer: MEDICARE

## 2023-01-18 ENCOUNTER — APPOINTMENT (OUTPATIENT)
Dept: INTERNAL MEDICINE | Facility: CLINIC | Age: 63
End: 2023-01-18

## 2023-01-18 PROCEDURE — 93306 TTE W/DOPPLER COMPLETE: CPT

## 2023-01-19 ENCOUNTER — APPOINTMENT (OUTPATIENT)
Dept: HEART AND VASCULAR | Facility: CLINIC | Age: 63
End: 2023-01-19

## 2023-01-19 PROBLEM — Z01.419 WELL WOMAN EXAM WITH ROUTINE GYNECOLOGICAL EXAM: Status: RESOLVED | Noted: 2021-02-18 | Resolved: 2023-01-19

## 2023-01-19 PROBLEM — Z13.5 SCREENING FOR EYE CONDITION: Status: RESOLVED | Noted: 2022-10-31 | Resolved: 2023-01-19

## 2023-01-20 ENCOUNTER — APPOINTMENT (OUTPATIENT)
Dept: FAMILY MEDICINE | Facility: CLINIC | Age: 63
End: 2023-01-20

## 2023-01-23 ENCOUNTER — APPOINTMENT (OUTPATIENT)
Dept: HEART AND VASCULAR | Facility: CLINIC | Age: 63
End: 2023-01-23

## 2023-01-23 DIAGNOSIS — Z13.5 ENCOUNTER FOR SCREENING FOR EYE AND EAR DISORDERS: ICD-10-CM

## 2023-01-23 DIAGNOSIS — Z01.419 ENCOUNTER FOR GYNECOLOGICAL EXAMINATION (GENERAL) (ROUTINE) W/OUT ABNORMAL FINDINGS: ICD-10-CM

## 2023-03-08 ENCOUNTER — EMERGENCY (EMERGENCY)
Facility: HOSPITAL | Age: 63
LOS: 1 days | Discharge: HOME | End: 2023-03-08
Attending: STUDENT IN AN ORGANIZED HEALTH CARE EDUCATION/TRAINING PROGRAM | Admitting: STUDENT IN AN ORGANIZED HEALTH CARE EDUCATION/TRAINING PROGRAM
Payer: MEDICARE

## 2023-03-08 VITALS
OXYGEN SATURATION: 95 % | DIASTOLIC BLOOD PRESSURE: 75 MMHG | SYSTOLIC BLOOD PRESSURE: 147 MMHG | HEART RATE: 83 BPM | WEIGHT: 175.05 LBS | TEMPERATURE: 98 F | RESPIRATION RATE: 18 BRPM | HEIGHT: 65 IN

## 2023-03-08 VITALS
RESPIRATION RATE: 17 BRPM | SYSTOLIC BLOOD PRESSURE: 153 MMHG | HEART RATE: 70 BPM | OXYGEN SATURATION: 98 % | DIASTOLIC BLOOD PRESSURE: 89 MMHG | TEMPERATURE: 98 F

## 2023-03-08 DIAGNOSIS — Z79.85 LONG-TERM (CURRENT) USE OF INJECTABLE NON-INSULIN ANTIDIABETIC DRUGS: ICD-10-CM

## 2023-03-08 DIAGNOSIS — R42 DIZZINESS AND GIDDINESS: ICD-10-CM

## 2023-03-08 DIAGNOSIS — H65.93 UNSPECIFIED NONSUPPURATIVE OTITIS MEDIA, BILATERAL: ICD-10-CM

## 2023-03-08 PROCEDURE — 70450 CT HEAD/BRAIN W/O DYE: CPT | Mod: 26,MG

## 2023-03-08 PROCEDURE — G1004: CPT

## 2023-03-08 PROCEDURE — 99284 EMERGENCY DEPT VISIT MOD MDM: CPT | Mod: 25

## 2023-03-08 PROCEDURE — 70450 CT HEAD/BRAIN W/O DYE: CPT | Mod: MG

## 2023-03-08 PROCEDURE — 99284 EMERGENCY DEPT VISIT MOD MDM: CPT

## 2023-03-08 RX ORDER — IBUPROFEN 200 MG
600 TABLET ORAL ONCE
Refills: 0 | Status: COMPLETED | OUTPATIENT
Start: 2023-03-08 | End: 2023-03-08

## 2023-03-08 RX ORDER — PSEUDOEPHEDRINE HCL 30 MG
30 TABLET ORAL ONCE
Refills: 0 | Status: COMPLETED | OUTPATIENT
Start: 2023-03-08 | End: 2023-03-08

## 2023-03-08 RX ORDER — AMLODIPINE BESYLATE 2.5 MG/1
0 TABLET ORAL
Qty: 0 | Refills: 0 | DISCHARGE

## 2023-03-08 RX ORDER — SEMAGLUTIDE 0.68 MG/ML
0 INJECTION, SOLUTION SUBCUTANEOUS
Qty: 0 | Refills: 0 | DISCHARGE

## 2023-03-08 RX ADMIN — Medication 30 MILLIGRAM(S): at 11:43

## 2023-03-08 RX ADMIN — Medication 600 MILLIGRAM(S): at 11:43

## 2023-03-08 NOTE — ED PROVIDER NOTE - NEUROLOGICAL, MLM
Alert and oriented, no focal deficits, no motor or sensory deficits. + finger to nose, + tendon walking, romberg neg, normal gait

## 2023-03-08 NOTE — ED PROVIDER NOTE - NS ED ATTENDING STATEMENT MOD
This was a shared visit with the TG. I reviewed and verified the documentation and independently performed the documented:

## 2023-03-08 NOTE — ED ADULT TRIAGE NOTE - WEIGHT IN KG
Problem: SLP ADULT - SWALLOWING, IMPAIRED  Goal: Advance to least restrictive diet without signs or symptoms of aspiration for planned discharge setting  See evaluation for individualized goals  Outcome: Adequate for Discharge    Lorri Peabody Wisocky, MA, CCC/SLP  [id number]  [email or web address]  Available via tiger text 79.4

## 2023-03-08 NOTE — ED ADULT NURSE NOTE - OBJECTIVE STATEMENT
Pt presenting with 1 week of dizziness, describes as room spinning. States worse with movement. Also endorsing 1 week of ear pain and pressure. States she self medicated with antibiotics thinking she had an ear infection. Denies fevers/chills

## 2023-03-08 NOTE — ED PROVIDER NOTE - NSFOLLOWUPINSTRUCTIONS_ED_ALL_ED_FT
TAKE SUDAFED AND IBUPROFEN EVERY 8 HRS AS NEEDED FOR SYMPTOMS, FOLLOW UP WITH ENT DOCTOR OR PMD, RETURN FOR ANY WORSENING OR CONCERNING SYMPTOMS   Fluid In The Ear (Serous Otitis Media)  WHAT YOU NEED TO KNOW:  SHAWN is fluid trapped in the middle of your ear behind your eardrum. This condition usually develops without signs or symptoms of an ear infection. Serous otitis media may be caused by an upper respiratory infection or allergies. It is most common in the fall and early spring.  DISCHARGE INSTRUCTIONS:  Call your doctor if:   •You develop severe ear pain.  •The outside of your ear is red or swollen.  •You have fluid coming from your ear.  •You have questions or concerns about your condition or care.  How to stay healthy:   •Wash your hands often throughout the day. Use soap and water. Rub your soapy hands together, lacing your fingers, for at least 20 seconds. Rinse with warm, running water. Dry your hands with a clean towel or paper towel. Use hand  that contains alcohol if soap and water are not available. Teach children how to wash their hands and use hand .  Handwashing  •Avoid people who are sick. Some germs are easily and quickly spread through contact.

## 2023-03-08 NOTE — ED PROVIDER NOTE - PATIENT PORTAL LINK FT
You can access the FollowMyHealth Patient Portal offered by [facility] by registering at the following website: [email or web address]. By joining [employer]’s FollowMyHealth portal, you will also be able to view your health information using other applications (apps) compatible with our system.

## 2023-03-08 NOTE — ED PROVIDER NOTE - CARE PROVIDER_API CALL
Lizzy Paniagua)  Otolaryngology  [address] [place] [address], [address]  [place], [place] [address]  Phone: [phone]  Fax: [phone]  Follow Up Time:

## 2023-03-08 NOTE — ED ADULT TRIAGE NOTE - CHIEF COMPLAINT QUOTE
Pt co dizziness and ear pain x1 wk. finished course of Amoxicillin and still has ear pain. Denies acute worsening of sx over past 24 hours.

## 2023-03-08 NOTE — ED PROVIDER NOTE - ENMT, MLM
Airway patent, Nasal mucosa clear. Mouth with normal mucosa. Throat has no vesicles, no oropharyngeal exudates and uvula is midline. Ears: + fluid behind b/l TMs. no AOE or AOM b/l, no cervical lymphadenopathy b/l

## 2023-03-08 NOTE — ED PROVIDER NOTE - CLINICAL SUMMARY MEDICAL DECISION MAKING FREE TEXT BOX
pt c/o clogged ears and dizziness x 1 wk, states that dizziness has improved but ears remain clogged. took left over abx, no meds for symptom control. on exam + serous otitis w/o AOM or AOE, non focal neuro exam - no clinical concern for cva or hemorrhage or meningitis. ct done to reassure pt. symptomatically improved w/sudafed and ibuprofen, stable for dc, pt understands and agrees w/plan, strict return precautions given

## 2023-03-08 NOTE — ED PROVIDER NOTE - OBJECTIVE STATEMENT
The pt is a 62 y/o F, who presents to ED c/o clogged ears and feeling dizzy x 1 wk. Pt states that ears feel clogged, + congestion, defines dizzy as feeling under water, self medicated w/some left over abx - no meds take for congestion symptoms. Denies fevers, chills, h/a, visual changes, hearing loss, n/v/d, neck pain, gait disturbances.

## 2023-03-13 ENCOUNTER — APPOINTMENT (OUTPATIENT)
Dept: OTOLARYNGOLOGY | Facility: CLINIC | Age: 63
End: 2023-03-13
Payer: MEDICARE

## 2023-03-13 VITALS — HEIGHT: 65 IN | WEIGHT: 183 LBS | BODY MASS INDEX: 30.49 KG/M2

## 2023-03-13 DIAGNOSIS — H93.299 OTHER ABNORMAL AUDITORY PERCEPTIONS, UNSPECIFIED EAR: ICD-10-CM

## 2023-03-13 PROCEDURE — 99203 OFFICE O/P NEW LOW 30 MIN: CPT

## 2023-03-13 PROCEDURE — 92557 COMPREHENSIVE HEARING TEST: CPT

## 2023-03-13 PROCEDURE — 92550 TYMPANOMETRY & REFLEX THRESH: CPT | Mod: 52

## 2023-03-13 NOTE — CONSULT LETTER
[FreeTextEntry2] : CAMMY LAWTON\par  [FreeTextEntry1] : \par \par Dear  Dr. CAMMY LAWTON,\par \par I had the pleasure of seeing your patient today.  \par Please see my note below.\par \par \par Thank you very much for allowing me to participate in the care of your patient.\par \par Sincerely,\par \par \par Berry Walsh MD\par [facility]\par [facility]\par  [facility]\par \par

## 2023-03-13 NOTE — REVIEW OF SYSTEMS
[Hearing Loss] : hearing loss [Dizziness] : dizziness [Vertigo] : vertigo [Ear Drainage] : ear drainage [Ear Noises] : ear noises

## 2023-03-13 NOTE — PHYSICAL EXAM
[Normal] : no rashes [de-identified] : audiometry was performed and reviewed with the patient.  It showed normal, symmetric hearing, except for an asymmetric drop in the left ear at [address], with recoevery.

## 2023-03-30 ENCOUNTER — TRANSCRIPTION ENCOUNTER (OUTPATIENT)
Age: 63
End: 2023-03-30

## 2023-06-12 ENCOUNTER — APPOINTMENT (OUTPATIENT)
Dept: ORTHOPEDIC SURGERY | Facility: CLINIC | Age: 63
End: 2023-06-12
Payer: MEDICARE

## 2023-06-12 VITALS
DIASTOLIC BLOOD PRESSURE: 87 MMHG | HEART RATE: 77 BPM | BODY MASS INDEX: 27.49 KG/M2 | WEIGHT: 165 LBS | HEIGHT: 65 IN | SYSTOLIC BLOOD PRESSURE: 153 MMHG | OXYGEN SATURATION: 98 %

## 2023-06-12 PROCEDURE — 99214 OFFICE O/P EST MOD 30 MIN: CPT | Mod: 25

## 2023-06-12 PROCEDURE — 20610 DRAIN/INJ JOINT/BURSA W/O US: CPT | Mod: RT

## 2023-06-12 NOTE — PROCEDURE
[de-identified] : The left knee was prepped with alchohol and ethyl chloride was used to numb the skin. A 3 cc injection with 1 cc xylocaine, 1cc sensoricaine and 1 cc kenalog, was given without complication into the knee joint. Patient instructed that there may be an inflammatory flare for 24 hrs , to use ice or advil if needed\par The right knee was prepped with alchohol and ethyl chloride was used to numb the skin. A 3 cc injection with 1 cc xylocaine, 1cc sensoricaine and 1 cc kenalog , was given without complication into the knee joint. Patient instructed that there may be an inflammatory flare for 24 hrs , to use ice or advil if needed\par \par

## 2023-06-12 NOTE — PHYSICAL EXAM
[de-identified] : 4 views taken previously 4 months ago at [facility] evaluated which show bilateral OA right KL2 left KL3\par

## 2023-06-12 NOTE — HISTORY OF PRESENT ILLNESS
[de-identified] : ZAINAB CAMARILLO is a 63 year  old  F patient that presents today with bilateral knee pain. Pt is interested in gel injections. Pt states her last set of injection was 8 months ago at Dr Stanford office which helped a lot and he is walking better now.  \par

## 2023-06-12 NOTE — DISCUSSION/SUMMARY
[de-identified] : Bilateral knee OA in a patient doing PRE and weight loss with some benefit. \par Wants injectables to help stave off TKA's bilaterally \par Plan \par Cortisone injections today \par Return in 6 weeks . \par We had a long discussion today about the risks and benefits of various Orthobiologic injection procedures. These included PRP, Amniotic Allograft product, Lipogems/lipoaspirate injections and BMAC. The fact that these treatments are investigational and not approved by any insurance product was also discussed and patient agreed to joining the [facility] Orthobiologic Registry and having her data stored in there and in the [facility] anonymized database for further study of Orthobiologic treatments. All questions were answered and informed consent will be obtained at the time of procedure. She would like to couple HA with the PRP is HA is covered. WIll check approval. \par \par \par \par

## 2023-06-16 ENCOUNTER — APPOINTMENT (OUTPATIENT)
Dept: PHYSICAL MEDICINE AND REHAB | Facility: CLINIC | Age: 63
End: 2023-06-16
Payer: MEDICARE

## 2023-06-16 VITALS
WEIGHT: 165 LBS | HEART RATE: 83 BPM | HEIGHT: 65 IN | OXYGEN SATURATION: 95 % | SYSTOLIC BLOOD PRESSURE: 137 MMHG | DIASTOLIC BLOOD PRESSURE: 88 MMHG | BODY MASS INDEX: 27.49 KG/M2

## 2023-06-16 DIAGNOSIS — M54.17 RADICULOPATHY, LUMBOSACRAL REGION: ICD-10-CM

## 2023-06-16 DIAGNOSIS — M62.830 MUSCLE SPASM OF BACK: ICD-10-CM

## 2023-06-16 DIAGNOSIS — R29.898 OTHER SYMPTOMS AND SIGNS INVOLVING THE MUSCULOSKELETAL SYSTEM: ICD-10-CM

## 2023-06-16 DIAGNOSIS — M67.959 UNSPECIFIED DISORDER OF SYNOVIUM AND TENDON, UNSPECIFIED THIGH: ICD-10-CM

## 2023-06-16 DIAGNOSIS — R20.2 PARESTHESIA OF SKIN: ICD-10-CM

## 2023-06-16 DIAGNOSIS — R29.91 UNSPECIFIED SYMPTOMS AND SIGNS INVOLVING THE MUSCULOSKELETAL SYSTEM: ICD-10-CM

## 2023-06-16 PROCEDURE — 99215 OFFICE O/P EST HI 40 MIN: CPT

## 2023-06-16 NOTE — ASSESSMENT
[FreeTextEntry1] :                                                       Assessment/Plan:\par \par ZAINAB CAMARILLO is a 63 year female with chronic low back pain here for initial consultation.\par \par Radiculitis, lumbosacral, S1, Left\par Spasm of the lumbosacral paraspinous muscles\par Paresthesia of leg, left\par Gluteus medius tendinopathy, Left \par Hip weakness, B/L\par Functional musculoskeletal complaint\par \par Osteoarthritis of knee, left > right\par \par - Tiers of treatment and management of above diagnosis(es) were discussed with patient\par - Optimal diet, weight, sleep, and lifestyle management to minimize stress and maximize well being counseling provided\par - Imaging reviewed and discussed with patient\par - Reviewed previous encounter notes from 6/12/2023 Dr. ARIANNA Rubio (Orthopedic Surgery Sports) \par - Patient was advised to start a structured, targeted therapy program 2-3x/wk for 8 wks with goal toward HEP\par - Patient was educated on an appropriate home exercise program, provided with exercise recommendations, all questions answered\par - Patient may trial acetaminophen 1000mg up to TID PRN moderate to severe pain and to decrease average consumption of NSAIDs\par - Patient was advised to start gabapentin for their neuropathic pain symptoms. Patient was instructed to begin with 100mg at bedtime for the next week. If well tolerated, patient will double their nightly dose to 200mg at bedtime. After another week, if the medication is well tolerated, they will commence 300mg at bedtime. Patient provided with written instructions as well. All questions were answered and the patient displayed a clear understanding of the plan of care, including titration of gabapentin. The patient was informed about not taking this medication at the same time as any sleeping or muscle relaxant pills. Pt was also notified to stop, and contact our office, if it is too sedating, ankle swelling occurs and/or they experience suicidal thinking.\par - Patient was prescribed methocarbamol 750mg 1-2 tabs up to TID PRN muscle spasm, informed of sedative potential, advised to avoid driving, taking the subway, or operating heavy machinery, patient displayed a clear understanding of the plan including medication, its purpose and side effects, all questions answered\par - Patient was advised to apply cool compresses or warm heat to affected regions PRN\par \par - Patient was prescribed medrol dose pack (x1) with written instructions, all questions answered, informed of side effects of the medication.  Possible side effects, including hyperglycemia, GI upset, and GI bleed, reviewed with patient. In agreement with patient that potential pain reduction and anti-inflammatory benefits currently outweigh known side effect profile for oral corticosteroids. Patient instructed to immediately stop medication should she develop any abdominal pain, nausea, vomiting, bloody stools, or BRBPR\par \par - Educated about red flag symptoms including (but not limited to) new, worsened, or persistent: fever greater than 100F, bowel or bladder incontinence, bowel obstipation, inability to void urine, urinary leakage, Severe nausea or vomiting, Worsening numbness, worsening tingling/paresthesias, and/or new or progressive motor weakness; advised to seek immediate medical attention at his nearest Emergency department should they experience any of the above\par \par - MRI lumbar spine without contrast is indicated given that the pt has not improved with tylenol, ibuprofen, naproxen, meloxicam, they underwent previous MRI of the region with multilevel lumbar degenerative disc disease, and h/o decreased bone density, and physical therapy/home exercise program>6 weeks. Patient's imaging is medically necessary to outline targets for locally (interventional) directed treatments and/or guide surgical management.\par \par - Follow up in 2-3 weeks after imaging, in person in 2 months to assess their progress\par \par I have personally spent a total of at least 65 minutes preparing, reviewing internal and external records, explaining, counseling, and coordinating care for this patient encounter.\par \par Thank you, Dr(s), for allowing me to participate in the care of your patient. Please do not hesitate to contact me with questions/concerns.\par \par Florin Douglas M.D.\par Sports and Interventional Spine\par Department of Physical Medicine and Rehabilitation \par [facility] \par Email: [email or web address]\par \par [facility]\par Orthopaedic [place] [facility] [place] [address]\par [facility], 11th Floor\[place] [address]\[facility] [place] \par Appointments: [phone]\[facility] [place] Fax: [phone]\[facility] [place]

## 2023-06-16 NOTE — HISTORY OF PRESENT ILLNESS
[FreeTextEntry1] : Florin Douglas M.D.\par Sports Medicine and Interventional Spine\[facility] Department of Physical Medicine and Rehabilitation \[facility] \[facility] Email: [email or web address] <[email or web address] \par [facility]opa[facility] [place] [facility]\par [address]\par [place], [address]th Floor\[place] [address]\par \par [facility] [place] at [facility]\par [address], 4th Floor\[place] [address]\par \[facility]  \par [facility]\par [address], 6th [address]\par [place], [place] [address] \[facility] Appoint[facility] Phone: [phone]\par Fax: [phone]\par \par ----------------------------------------------------------------------------------------------------------------------------------------\par \par PATIENT: ZAINAB CAMARILLO \par MRN: [id number] \par YOB: 1960 \par DATE OF SERVICE: 06/16/2023 \par \par Jun 16, 2023 \[facility] \par \par Dear Drs.\par \par Thank you for referring ZAINAB CAMARILLO to my Sports and Interventional Spine practice and office. Enclosed is a copy of the patient's consultation/progress note, which includes my complete assessment and recent studies completed during the patient's evaluation.\par \par If you have questions or have any patients who require nonsurgical, non-opiate management of any sports, spine, or musculoskeletal conditions, please do not hesitate to contact my , Deneen Chinchilla at [phone].\par \par I look forward to taking care of your patients along with you.\par \par Sincerely,\par \par Florin\par \par Florin Douglas MD\par Sports, Interventional Spine, & Regenerative Musculoskeletal Medicine\par Orthopaedic [place] at [facility]\par Email: [email or web address]\par \par \par                                                   Initial Consultation:\par CC: radiating left leg pain\par \par HPI:  This is the first visit to [facility] [place] at [facility] Medicine [facility] Interventional Spine Practice.  \par \par ZAINAB CAMARILLO presents with the chief complaint as above.  \par \par Initial Hx on 06/16/2023 :\par Presents in person to [place]\par points to the left gluteal region, left posterolateral thigh and left posterior lower leg\par The patient’s difficulties began 3-4 weeks ago\par prompted to come for evaluation due to overnight back and leg pain\par The pain is graded as mild to moderate\par The pain is described as radiating, "numbish"\par The pain is intermittent\par The pain does not radiate\par The pain radiates in the LEFT LOWER limbs in a L5, S1 distribution\par The patient feels that the pain is overall persistent \par Patient denies other recent fall, MVA, injury, trauma, or accident besides presenting history above\par \par Aggravating: ambulation, prolonged sitting, prolonged standing, navigating stairs, getting out of bed, sit to stand transitional movements\par Alleviating: rest, activity modification, avoiding water aerobics, pharmacologic treatments (ibuprofen 400mg PRN)\par \par Meds: denies regular PO pain medications, as above\par Therapy Program: no recent structured targeted therapy program\par HEP: doing HEP regularly\par \par Assoc Sx:\par Reports intermittent numbness, tingling paresthesia in the left LOWER limbs in a S1, L5 distribution\par Otherwise denies numbness, Tingling\par \par Denies Focal motor weakness in the upper or lower limbs\par Denies New or worsened bowel or bladder incontinence\par Denies Saddle anesthesia\par Denies Using Orthotic(s)/Supportive devices\par Denies Swelling in the upper/lower extremities\par They also deny frequent tripping, falling\par \par ROS: A 14 point review of systems was completed. Positive findings are pain as described above. The remaining systems negative.\par \par Breast Cancer Surveillance: up to date\par COVID HX: reviewed\par \par Assoc Hx:\par was injured by a massage therapist, injured her knee during forced flexion\par Ambulates without assistive device\par Injection Hx: denies locally directed treatment to the area in question\par Imaging Hx: reviewed\par \par Level of functioning: indep with ambulation, indep with ADLs\par Living Situation: dwelling with steps to enter

## 2023-06-16 NOTE — PHYSICAL EXAM
[FreeTextEntry1] : Gen: A+O x 3 in NAD\par Psych: Normal mood and affect. Responds appropriately to commands\par Eyes: Anicteric. No discharge. EOMI.\par Resp: Breathing unlabored\par CV: DP pulses 2+ and equal. No varicosities noted\par Ext: No c/c/e\par Skin: No lesions noted\par \par Gait:\par Non antalgic \par +  reciprocating heel to toe\par able to stand on toes and heels WITH hand holding\par Tandem gait intact WITH hand holding\par Poor single leg standing balance B/L\par Romberg negative\par \par Trendelenburg present with B/L stance leg\par \par Inspection: Spine alignment is midline.\par Palpation: There is + tenderness over the midline spinous processes, paravertebral muscles of the thoracolumbar region\par Lumbar ROM: Flexion, extension, side-bending, rotation, limited in most planes\par +pain with lateral flexion\par +pain with oblique extension\par +pain with lateral rotation \par \par Hip ROM: neg pain at terminal ROM bilaterally.\par FAIR, FABERE negative bilaterally.\par \par 	Hip Flex       Knee Ext      Ankle Dorsi           EHL        Ankle Plantar\par Right	4/5	        5/5	                 5/5	          4/5	             5/5                           \par Left	4/5	        5/5	                 5/5	          4/5	             5/5                           \par \par Hip abduction R 4+/5 L 4/5\par Hip adduction R 4+/5 L 4/5\par Hip extension R 4+/5 L 4/5\par Knee Flexion R 4+/5 L 4/5\par \par Tone: Normal. No clonus.\par Sensation: Grossly intact to light touch bilateral lower limbs.\par Proprioception: Intact at big toes bilaterally.\par Reflexes: 3+ symmetric knee jerk, ankle jerk. \par Plantars downgoing bilaterally.\par SLR negative bilaterally\par Crossed SLR + right with left\par Slump Test negative bilaterally\par \par prone gapping test neg B/L\par yeoman neg B/L\par nachlas neg B/L\par active hip extension was more difficult on neither

## 2023-07-25 ENCOUNTER — APPOINTMENT (OUTPATIENT)
Dept: ORTHOPEDIC SURGERY | Facility: CLINIC | Age: 63
End: 2023-07-25
Payer: SELF-PAY

## 2023-07-25 DIAGNOSIS — M17.0 BILATERAL PRIMARY OSTEOARTHRITIS OF KNEE: ICD-10-CM

## 2023-07-25 PROCEDURE — 0232T NJX PLATELET PLASMA: CPT

## 2023-07-25 PROCEDURE — 005KIT: CUSTOM | Mod: 25

## 2023-07-25 NOTE — PROCEDURE
[de-identified] : The patient was fully consented for the [facility]  PRP registry and Bio-repository study in conjunction with the Biologic Association Registry and Biorepository. \par Risks and benefits of prp injection were discussed and the fact PRP is an off label use for OA. We then explained that an aliquot of whole blood and prp will be stored for further study in the Biorepository. \par Under sterile conditions 60 cc of blood was taken for prp preparation for both knees . This was placed in the [employer] centrifuge and centrifuged for appropriate time to form WBC poor PRP. \par Concentration according to the Cell counter was a 9X concentration of platelets. \par The superolateral right knee was anaesthetized with injection of 3 cc of xylocaine subQ after prepping with alchohol.  Once fully prepared the PRP was injected without complication via a superolateral entry.The superolateral portal of left knee was similarlly anaesthetized and then injected with the prepared WBC poor PRP.  Patient was told there maybe an inflammatory flare for 24-48 hours. May use ice and tylenol but to avoid NSADS for one week.\par Both knees also had injection of Durolane Hyaluronic acid concomitantly. lot [id number]\par \par

## 2023-07-27 ENCOUNTER — APPOINTMENT (OUTPATIENT)
Dept: ORTHOPEDIC SURGERY | Facility: CLINIC | Age: 63
End: 2023-07-27

## 2023-07-27 ENCOUNTER — TRANSCRIPTION ENCOUNTER (OUTPATIENT)
Age: 63
End: 2023-07-27

## 2023-08-12 ENCOUNTER — RX RENEWAL (OUTPATIENT)
Age: 63
End: 2023-08-12

## 2023-09-27 ENCOUNTER — APPOINTMENT (OUTPATIENT)
Dept: ORTHOPEDIC SURGERY | Facility: CLINIC | Age: 63
End: 2023-09-27
Payer: MEDICARE

## 2023-09-27 PROCEDURE — 99213 OFFICE O/P EST LOW 20 MIN: CPT

## 2023-11-20 ENCOUNTER — TRANSCRIPTION ENCOUNTER (OUTPATIENT)
Age: 63
End: 2023-11-20

## 2023-11-20 ENCOUNTER — APPOINTMENT (OUTPATIENT)
Dept: OPHTHALMOLOGY | Facility: CLINIC | Age: 63
End: 2023-11-20

## 2023-11-22 ENCOUNTER — APPOINTMENT (OUTPATIENT)
Dept: ORTHOPEDIC SURGERY | Facility: CLINIC | Age: 63
End: 2023-11-22

## 2023-11-27 ENCOUNTER — TRANSCRIPTION ENCOUNTER (OUTPATIENT)
Age: 63
End: 2023-11-27

## 2023-11-28 ENCOUNTER — TRANSCRIPTION ENCOUNTER (OUTPATIENT)
Age: 63
End: 2023-11-28

## 2023-11-28 ENCOUNTER — RX RENEWAL (OUTPATIENT)
Age: 63
End: 2023-11-28

## 2023-11-29 ENCOUNTER — TRANSCRIPTION ENCOUNTER (OUTPATIENT)
Age: 63
End: 2023-11-29

## 2023-12-21 ENCOUNTER — APPOINTMENT (OUTPATIENT)
Dept: HEART AND VASCULAR | Facility: CLINIC | Age: 63
End: 2023-12-21
Payer: MEDICARE

## 2023-12-21 ENCOUNTER — TRANSCRIPTION ENCOUNTER (OUTPATIENT)
Age: 63
End: 2023-12-21

## 2023-12-21 ENCOUNTER — NON-APPOINTMENT (OUTPATIENT)
Age: 63
End: 2023-12-21

## 2023-12-21 ENCOUNTER — RX RENEWAL (OUTPATIENT)
Age: 63
End: 2023-12-21

## 2023-12-21 VITALS
HEIGHT: 65 IN | TEMPERATURE: 97.9 F | HEART RATE: 69 BPM | BODY MASS INDEX: 26.66 KG/M2 | OXYGEN SATURATION: 99 % | SYSTOLIC BLOOD PRESSURE: 167 MMHG | DIASTOLIC BLOOD PRESSURE: 92 MMHG | WEIGHT: 160 LBS

## 2023-12-21 VITALS — BODY MASS INDEX: 26.63 KG/M2 | SYSTOLIC BLOOD PRESSURE: 138 MMHG | DIASTOLIC BLOOD PRESSURE: 86 MMHG | HEIGHT: 65 IN

## 2023-12-21 DIAGNOSIS — M17.10 UNILATERAL PRIMARY OSTEOARTHRITIS, UNSPECIFIED KNEE: ICD-10-CM

## 2023-12-21 PROCEDURE — 99214 OFFICE O/P EST MOD 30 MIN: CPT | Mod: 25

## 2023-12-21 PROCEDURE — 93000 ELECTROCARDIOGRAM COMPLETE: CPT

## 2023-12-21 RX ORDER — AMLODIPINE BESYLATE 10 MG/1
10 TABLET ORAL DAILY
Qty: 90 | Refills: 3 | Status: ACTIVE | COMMUNITY
Start: 2020-10-20 | End: 1900-01-01

## 2023-12-21 NOTE — ASSESSMENT
[FreeTextEntry1] : =======================================================================================1. HTN: BP near ACC/AHA 2017 guideline target       - continue [id number] po qd         - if BP remains above target next visit will up titrate anti-HTN regimen   2. Overweight: BMI 26.6: + weight loss:        - we had an extensive discussion about therapeutic lifestyle changes to promote increased cardiovascular fitness and achieving goal weight  3. Dyslipidemia:  (09/21/22):     - discussed therapeutic lifestyle changes to promote improved lipid metabolism      - she will see a new PMD tomorrow and have lab work done   4. Pre-DM2: A1c 6.1% (09/21/22):      - discussed with patient therapeutic lifestyle changes to improve glucose metabolism      - she will see a new PMD tomorrow and have lab work done

## 2023-12-21 NOTE — HISTORY OF PRESENT ILLNESS
[FreeTextEntry1] : Ms. Barron presents for follow up and management of HTN, dyslipidemia, pre-DM2, and overweight.  She was followed by another cardiologist, Oscar Rogers MD for a number of years but wanted to establish care with a new cardiologist.   We had an extensive discussion about therapeutic lifestyle changes to promote increased cardiovascular fitness and achieving goal weight.  She had an echocardiogram on 01/18/23 which revealed an EF of 68% and was a normal study.  She is engaged in physical exercise and has lost 23 pounds since March 2023.  At present, she has complaints of knee pain and we discussed seeing Garett Luis MD, for a second opinion on options to improve her symptoms and mobility.

## 2023-12-21 NOTE — REASON FOR VISIT
[FreeTextEntry1] : ======================================================================================= Diagnostic Tests: ------------------------------------ EC23: sinus rhythm, PRWP.  22: sinus rhythm, PRWP.  04/15/21: NSR, CLIVE, PRWP. 20: NSR, CLIVE, PRWP.  19: NSR, CLIVE.  18: NSR, CLIVE.  16: NSR, CLIVE.  16: NSR, CLIVE.  ------------------------------------- Echo: 23: EF 68%, normal study.  20: EF 60%, normal study.  -------------------------------------- Sleep studies: 20: AHI 1, effectively no ANUM

## 2023-12-22 ENCOUNTER — APPOINTMENT (OUTPATIENT)
Dept: INTERNAL MEDICINE | Facility: CLINIC | Age: 63
End: 2023-12-22
Payer: MEDICARE

## 2023-12-22 VITALS
TEMPERATURE: 98 F | SYSTOLIC BLOOD PRESSURE: 150 MMHG | OXYGEN SATURATION: 99 % | DIASTOLIC BLOOD PRESSURE: 70 MMHG | RESPIRATION RATE: 14 BRPM | HEART RATE: 67 BPM

## 2023-12-22 PROCEDURE — 99397 PER PM REEVAL EST PAT 65+ YR: CPT | Mod: 25

## 2023-12-22 PROCEDURE — 36415 COLL VENOUS BLD VENIPUNCTURE: CPT

## 2023-12-22 RX ORDER — TRETINOIN 0.5 MG/G
0.05 CREAM TOPICAL
Qty: 20 | Refills: 11 | Status: ACTIVE | COMMUNITY
Start: 2021-02-18

## 2023-12-22 RX ORDER — METHYLPREDNISOLONE 4 MG/1
4 TABLET ORAL
Qty: 1 | Refills: 0 | Status: DISCONTINUED | COMMUNITY
Start: 2023-06-16 | End: 2023-12-22

## 2023-12-22 RX ORDER — FLUTICASONE PROPIONATE 50 UG/1
50 SPRAY, METERED NASAL
Qty: 16 | Refills: 5 | Status: DISCONTINUED | COMMUNITY
Start: 2023-03-13 | End: 2023-12-22

## 2023-12-22 RX ORDER — CONJUGATED ESTROGENS AND MEDROXYPROGESTERONE ACETATE .3; 1.5 MG/1; MG/1
0.3-1.5 TABLET, SUGAR COATED ORAL
Qty: 28 | Refills: 2 | Status: DISCONTINUED | COMMUNITY
Start: 2021-02-18 | End: 2023-12-22

## 2023-12-22 RX ORDER — METHOCARBAMOL 750 MG/1
750 TABLET, FILM COATED ORAL
Qty: 60 | Refills: 1 | Status: ACTIVE | COMMUNITY
Start: 2023-06-16

## 2023-12-27 ENCOUNTER — APPOINTMENT (OUTPATIENT)
Dept: ORTHOPEDIC SURGERY | Facility: CLINIC | Age: 63
End: 2023-12-27

## 2023-12-29 ENCOUNTER — TRANSCRIPTION ENCOUNTER (OUTPATIENT)
Age: 63
End: 2023-12-29

## 2023-12-29 LAB
ANION GAP SERPL CALC-SCNC: 13 MMOL/L
BUN SERPL-MCNC: 15 MG/DL
CALCIUM SERPL-MCNC: 9.6 MG/DL
CHLORIDE SERPL-SCNC: 103 MMOL/L
CHOLEST SERPL-MCNC: 237 MG/DL
CO2 SERPL-SCNC: 22 MMOL/L
CREAT SERPL-MCNC: 0.61 MG/DL
CREAT SPEC-SCNC: 40 MG/DL
EGFR: 99 ML/MIN/1.73M2
ESTIMATED AVERAGE GLUCOSE: 111 MG/DL
GLUCOSE SERPL-MCNC: 95 MG/DL
HBA1C MFR BLD HPLC: 5.5 %
HCT VFR BLD CALC: 45 %
HDLC SERPL-MCNC: 86 MG/DL
HGB BLD-MCNC: 14.5 G/DL
LDLC SERPL CALC-MCNC: 138 MG/DL
MCHC RBC-ENTMCNC: 29.8 PG
MCHC RBC-ENTMCNC: 32.2 GM/DL
MCV RBC AUTO: 92.4 FL
MICROALBUMIN 24H UR DL<=1MG/L-MCNC: <1.2 MG/DL
MICROALBUMIN/CREAT 24H UR-RTO: NORMAL MG/G
NONHDLC SERPL-MCNC: 151 MG/DL
PLATELET # BLD AUTO: 252 K/UL
POTASSIUM SERPL-SCNC: 4.5 MMOL/L
RBC # BLD: 4.87 M/UL
RBC # FLD: 13.6 %
SODIUM SERPL-SCNC: 138 MMOL/L
TRIGL SERPL-MCNC: 78 MG/DL
WBC # FLD AUTO: 6.87 K/UL

## 2023-12-29 NOTE — ASSESSMENT
[FreeTextEntry1] : HCM - DUE: pap/mammo - DEXA UTD -1.4 (2021) - Cscope 2022 UTD at OSH; repeat in 5 years (hx of polyps) - PHQ9 today - Labs today: CBC, CMP, lipid panel, a1c, vit D  RTC in 1 year or earlier PRN

## 2023-12-29 NOTE — HISTORY OF PRESENT ILLNESS
[FreeTextEntry1] : CPE [de-identified] : THis is a 63F with PMHx of HTN, HLD , chronic b/l knee OA, pre-DM, overweight who presents for CPE. She has no specific complaints today. Chronic b/l knee pain - 2/2 overuse from spin class and weight, knee XR in 2021 showed degeneration, f/w sports medicine and planning to enroll in clinical trial at [facility] HTN - c/w amlodipine, BP goal <120 HLD - not currently on mgmt, follows strict diet/exercise regimen  Social history: Drinks several glasses/wine per week with plan to cut down (believes its empty calories), follows strict mediterranean diet with chicken/fish, whole grains, kale salads. Attends pilates classes. Former  (stopped working since COVID). Lives alone but has family in the neighborhood.

## 2024-01-19 ENCOUNTER — RX CHANGE (OUTPATIENT)
Age: 64
End: 2024-01-19

## 2024-01-19 RX ORDER — FLUTICASONE PROPIONATE 50 UG/1
50 SPRAY, METERED NASAL
Qty: 48 | Refills: 3 | Status: ACTIVE | COMMUNITY
Start: 2024-01-19 | End: 1900-01-01

## 2024-01-24 ENCOUNTER — APPOINTMENT (OUTPATIENT)
Dept: UROGYNECOLOGY | Facility: CLINIC | Age: 64
End: 2024-01-24
Payer: MEDICARE

## 2024-01-24 VITALS
OXYGEN SATURATION: 98 % | HEART RATE: 86 BPM | DIASTOLIC BLOOD PRESSURE: 84 MMHG | WEIGHT: 160 LBS | BODY MASS INDEX: 26.63 KG/M2 | SYSTOLIC BLOOD PRESSURE: 132 MMHG

## 2024-01-24 DIAGNOSIS — Z00.00 ENCOUNTER FOR GENERAL ADULT MEDICAL EXAMINATION W/OUT ABNORMAL FINDINGS: ICD-10-CM

## 2024-01-24 PROCEDURE — 99397 PER PM REEVAL EST PAT 65+ YR: CPT

## 2024-01-28 ENCOUNTER — NON-APPOINTMENT (OUTPATIENT)
Age: 64
End: 2024-01-28

## 2024-01-29 ENCOUNTER — NON-APPOINTMENT (OUTPATIENT)
Age: 64
End: 2024-01-29

## 2024-01-29 ENCOUNTER — APPOINTMENT (OUTPATIENT)
Dept: ORTHOPEDIC SURGERY | Facility: CLINIC | Age: 64
End: 2024-01-29
Payer: SUBSIDIZED

## 2024-01-29 ENCOUNTER — TRANSCRIPTION ENCOUNTER (OUTPATIENT)
Age: 64
End: 2024-01-29

## 2024-01-29 DIAGNOSIS — M17.4 OTHER BILATERAL SECONDARY OSTEOARTHRITIS OF KNEE: ICD-10-CM

## 2024-01-29 LAB
CYTOLOGY CVX/VAG DOC THIN PREP: ABNORMAL
HPV HIGH+LOW RISK DNA PNL CVX: NOT DETECTED

## 2024-01-29 PROCEDURE — 99213 OFFICE O/P EST LOW 20 MIN: CPT

## 2024-01-31 ENCOUNTER — TRANSCRIPTION ENCOUNTER (OUTPATIENT)
Age: 64
End: 2024-01-31

## 2024-02-02 ENCOUNTER — TRANSCRIPTION ENCOUNTER (OUTPATIENT)
Age: 64
End: 2024-02-02

## 2024-02-02 ENCOUNTER — APPOINTMENT (OUTPATIENT)
Dept: ENDOCRINOLOGY | Facility: CLINIC | Age: 64
End: 2024-02-02
Payer: MEDICARE

## 2024-02-02 VITALS
SYSTOLIC BLOOD PRESSURE: 179 MMHG | HEART RATE: 91 BPM | BODY MASS INDEX: 26.96 KG/M2 | WEIGHT: 162 LBS | DIASTOLIC BLOOD PRESSURE: 84 MMHG

## 2024-02-02 PROCEDURE — 99213 OFFICE O/P EST LOW 20 MIN: CPT

## 2024-02-02 NOTE — HISTORY OF PRESENT ILLNESS
[FreeTextEntry1] : Her friend advised her to see an endocrinologist because friend is doing well on bio-identical HRT and losing weight with her endocrinologist.  Pt feel tired and achy.  She is achy after every time she exercises. She gets enough sleep at night. She does Pilates, barre exercises, aqua aerobics, and walks her dog.  She goes to the gym 3-5x/week.  She has two bad knees but is hoping not to do knee replacements.  She was trying to get into a study using stem cells but she just got rejected from that study. She lost 30 lb on Ozempic, which she is getting from [facility] weight loss center, but weight has plateau'd.   She wants her weight to get to 130-132 lb.  Initial weight was close to 190 lb. She drinks red wine, says "probably too much." labs reviewed from EMR, 12/23:  , A1c 5.4%, normal urine albumin. osteopenia fem neck (2021) normal thyroid sonogram, 8/21  Meds amlodipine 10mg Ozempic 2mg/week ([facility] weight loss center) Flonase

## 2024-02-02 NOTE — DATA REVIEWED
[FreeTextEntry1] : 12/23  tot chol 237, trig 78, HDL 86, , A1c 5.5%, urine alb < 1.2  bone density, 8/21 ([facility]) L hip  T -0.8 fem neck T -1.4 spine T + 1.5  thyroid sono, 8/21: homogenous gland, normal size, no nodules

## 2024-02-02 NOTE — ASSESSMENT
[FreeTextEntry1] : Fatigue.  I do not recommend HRT since she is more than 10 years out from menopause.  I generally will recommend for menopause symptoms if patient is within 5 years of menopause. I don't think fatigue and achiness is due to endocrine cause.   I recommended continuing regular exercise, stretching. Continue following at [facility] weight loss center, and they can manage weight loss meds (I don't want to interfere).  I recommended avoiding eating late, limiting alcohol to 3 servings per week, limit intake of processed carbs.  Her TSH has not been tested recently but I suspect this will be normal.  Thyroid sono was normal and her TSH was always normal when it was tested previously.  and she has no FH of thyroid disease. RTO prn

## 2024-02-05 NOTE — HISTORY OF PRESENT ILLNESS
[Previously active] : previously active [Men] : men [FreeTextEntry1] : 64 yr. old female presents for an annual exam. Feeling well. Denies any GYN complaints today. Denies any PMB.

## 2024-02-05 NOTE — PHYSICAL EXAM
[Chaperone Present] : A chaperone was present in the examining room during all aspects of the physical examination [Alert] : alert [Appropriately responsive] : appropriately responsive [No Acute Distress] : no acute distress [No Lymphadenopathy] : no lymphadenopathy [Soft] : soft [Non-tender] : non-tender [Non-distended] : non-distended [No Lesions] : no lesions [No Mass] : no mass [Oriented x3] : oriented x3 [Examination Of The Breasts] : a normal appearance [No Masses] : no breast masses were palpable [Labia Majora] : normal [Labia Minora] : normal [Normal] : normal [Uterine Adnexae] : normal

## 2024-02-05 NOTE — PLAN
[FreeTextEntry1] : Pap sent Referrals for mammo, breast sono and DEXA given to patient. RTO in 1 year for annual exam or PRN

## 2024-02-06 ENCOUNTER — APPOINTMENT (OUTPATIENT)
Dept: ORTHOPEDIC SURGERY | Facility: CLINIC | Age: 64
End: 2024-02-06

## 2024-02-09 ENCOUNTER — NON-APPOINTMENT (OUTPATIENT)
Age: 64
End: 2024-02-09

## 2024-02-14 ENCOUNTER — TRANSCRIPTION ENCOUNTER (OUTPATIENT)
Age: 64
End: 2024-02-14

## 2024-02-15 ENCOUNTER — TRANSCRIPTION ENCOUNTER (OUTPATIENT)
Age: 64
End: 2024-02-15

## 2024-02-20 PROBLEM — M17.4 OTHER SECONDARY OSTEOARTHRITIS OF BOTH KNEES: Status: ACTIVE | Noted: 2024-02-20

## 2024-02-20 NOTE — DISCUSSION/SUMMARY
[de-identified] : Unfortunately Hanna Barron failed the screening WOMAC scoring for her two knees and therefore does not qualify for this knee OA  study.  we will see her back to discuss other options.

## 2024-03-04 ENCOUNTER — APPOINTMENT (OUTPATIENT)
Dept: ORTHOPEDIC SURGERY | Facility: CLINIC | Age: 64
End: 2024-03-04
Payer: SUBSIDIZED

## 2024-03-04 PROCEDURE — 99214 OFFICE O/P EST MOD 30 MIN: CPT

## 2024-03-04 NOTE — HISTORY OF PRESENT ILLNESS
[de-identified] : Hanna failed any benefit from PRP in both knees specifically the left last July over 8 months ago.  She now comes in for qualification visit for the [employer] RC study for ASA.

## 2024-03-04 NOTE — DISCUSSION/SUMMARY
[de-identified] : Patient here for qualification visit of left knee for [employer] Randomized trial for Amniotic suspension allograft.  All appropriate qualification exams performed. " WIll return for study if qualified and approved.

## 2024-03-04 NOTE — PHYSICAL EXAM
[de-identified] : Full exam performed today as per protocol HEENT WNL Lungs clear  Heart S1S2 no murmur Abdoman: soft normal bowel sounds.  Neurologic exam upper and lower extremity WNL Left knee medial and lateral joint line tenderness no effusion today.  Negative Lachman and Milagro  Psotove PF crepitance.   [de-identified] : PA of knees shows:

## 2024-03-05 ENCOUNTER — APPOINTMENT (OUTPATIENT)
Dept: MAMMOGRAPHY | Facility: HOSPITAL | Age: 64
End: 2024-03-05
Payer: MEDICARE

## 2024-03-05 ENCOUNTER — OUTPATIENT (OUTPATIENT)
Dept: OUTPATIENT SERVICES | Facility: HOSPITAL | Age: 64
LOS: 1 days | End: 2024-03-05
Payer: MEDICARE

## 2024-03-05 ENCOUNTER — APPOINTMENT (OUTPATIENT)
Dept: ULTRASOUND IMAGING | Facility: HOSPITAL | Age: 64
End: 2024-03-05
Payer: MEDICARE

## 2024-03-05 ENCOUNTER — RESULT REVIEW (OUTPATIENT)
Age: 64
End: 2024-03-05

## 2024-03-05 ENCOUNTER — APPOINTMENT (OUTPATIENT)
Dept: RADIOLOGY | Facility: HOSPITAL | Age: 64
End: 2024-03-05
Payer: MEDICARE

## 2024-03-05 PROCEDURE — 77063 BREAST TOMOSYNTHESIS BI: CPT | Mod: 26

## 2024-03-05 PROCEDURE — 77080 DXA BONE DENSITY AXIAL: CPT | Mod: 26

## 2024-03-05 PROCEDURE — 77067 SCR MAMMO BI INCL CAD: CPT | Mod: 26

## 2024-03-05 PROCEDURE — 76641 ULTRASOUND BREAST COMPLETE: CPT | Mod: 26,50

## 2024-03-05 PROCEDURE — 77080 DXA BONE DENSITY AXIAL: CPT

## 2024-03-05 PROCEDURE — 76641 ULTRASOUND BREAST COMPLETE: CPT

## 2024-03-05 PROCEDURE — 77063 BREAST TOMOSYNTHESIS BI: CPT

## 2024-03-05 PROCEDURE — 77067 SCR MAMMO BI INCL CAD: CPT

## 2024-03-06 ENCOUNTER — NON-APPOINTMENT (OUTPATIENT)
Age: 64
End: 2024-03-06

## 2024-03-06 ENCOUNTER — TRANSCRIPTION ENCOUNTER (OUTPATIENT)
Age: 64
End: 2024-03-06

## 2024-03-13 ENCOUNTER — RX RENEWAL (OUTPATIENT)
Age: 64
End: 2024-03-13

## 2024-03-25 NOTE — ED ADULT NURSE NOTE - HISTORY OF COVID-19 VACCINATION
Patient attended session 28 of outpatient Phase 2 cardiac rehab. See scanned in exercise session report in media tab.    
Yes

## 2024-05-05 PROBLEM — I10 HTN (HYPERTENSION): Status: ACTIVE | Noted: 2022-09-22

## 2024-05-05 PROBLEM — E78.5 DYSLIPIDEMIA: Status: ACTIVE | Noted: 2022-09-22

## 2024-05-05 PROBLEM — R42 DIZZINESS: Status: ACTIVE | Noted: 2023-03-13

## 2024-05-05 PROBLEM — E66.3 PATIENT OVERWEIGHT: Status: ACTIVE | Noted: 2021-07-26

## 2024-05-05 PROBLEM — R73.03 PREDIABETES: Status: ACTIVE | Noted: 2021-07-27

## 2024-05-05 NOTE — ASSESSMENT
[FreeTextEntry1] : =======================================================================================1. HTN: BP near ACC/AHA 2017 guideline target       - continue [id number] po qd         - if BP remains above target next visit will up titrate anti-HTN regimen   2. Overweight: BMI 26.6: + weight loss:        - we had an extensive discussion about therapeutic lifestyle changes to promote increased cardiovascular fitness and achieving goal weight  3. Dyslipidemia:  (12/22/23):     - discussed therapeutic lifestyle changes to promote improved lipid metabolism      - she will see a new PMD tomorrow and have lab work done   4. Pre-DM2: A1c 5.5% (12/22/23):     - discussed with patient therapeutic lifestyle changes to improve glucose metabolism      - she will see a new PMD tomorrow and have lab work done

## 2024-05-05 NOTE — PHYSICAL EXAM
[General Appearance - Well Developed] : well developed [Normal Appearance] : normal appearance [Well Groomed] : well groomed [General Appearance - Well Nourished] : well nourished [No Deformities] : no deformities [General Appearance - In No Acute Distress] : no acute distress [Normal Conjunctiva] : the conjunctiva exhibited no abnormalities [Eyelids - No Xanthelasma] : the eyelids demonstrated no xanthelasmas [Normal Oral Mucosa] : normal oral mucosa [No Oral Pallor] : no oral pallor [No Oral Cyanosis] : no oral cyanosis [Normal Jugular Venous A Waves Present] : normal jugular venous A waves present [Normal Jugular Venous V Waves Present] : normal jugular venous V waves present [No Jugular Venous Moreno A Waves] : no jugular venous moreno A waves [Respiration, Rhythm And Depth] : normal respiratory rhythm and effort [Exaggerated Use Of Accessory Muscles For Inspiration] : no accessory muscle use [Auscultation Breath Sounds / Voice Sounds] : lungs were clear to auscultation bilaterally [Abdomen Soft] : soft [Abdomen Tenderness] : non-tender [Abdomen Mass (___ Cm)] : no abdominal mass palpated [Abnormal Walk] : normal gait [Gait - Sufficient For Exercise Testing] : the gait was sufficient for exercise testing [Nail Clubbing] : no clubbing of the fingernails [Cyanosis, Localized] : no localized cyanosis [Petechial Hemorrhages (___cm)] : no petechial hemorrhages [Skin Color & Pigmentation] : normal skin color and pigmentation [] : no rash [No Venous Stasis] : no venous stasis [No Skin Ulcers] : no skin ulcer [Skin Lesions] : no skin lesions [No Xanthoma] : no  xanthoma was observed [Oriented To Time, Place, And Person] : oriented to person, place, and time [Affect] : the affect was normal [Mood] : the mood was normal [No Anxiety] : not feeling anxious [Normal Rate] : normal [Normal S1] : normal S1 [Normal S2] : normal S2 [No Murmur] : no murmurs heard [2+] : right 2+ [No Abnormalities] : the abdominal aorta was not enlarged and no bruit was heard [No Pitting Edema] : no pitting edema present [FreeTextEntry1] : + overweight [S3] : no S3 [S4] : no S4 [Right Carotid Bruit] : no bruit heard over the right carotid [Left Carotid Bruit] : no bruit heard over the left carotid [Right Femoral Bruit] : no bruit heard over the right femoral artery [Left Femoral Bruit] : no bruit heard over the left femoral artery

## 2024-05-07 ENCOUNTER — APPOINTMENT (OUTPATIENT)
Dept: HEART AND VASCULAR | Facility: CLINIC | Age: 64
End: 2024-05-07

## 2024-05-07 DIAGNOSIS — E66.3 OVERWEIGHT: ICD-10-CM

## 2024-05-07 DIAGNOSIS — I10 ESSENTIAL (PRIMARY) HYPERTENSION: ICD-10-CM

## 2024-05-07 DIAGNOSIS — R42 DIZZINESS AND GIDDINESS: ICD-10-CM

## 2024-05-07 DIAGNOSIS — R73.03 PREDIABETES.: ICD-10-CM

## 2024-05-07 DIAGNOSIS — E78.5 HYPERLIPIDEMIA, UNSPECIFIED: ICD-10-CM

## 2024-05-14 ENCOUNTER — APPOINTMENT (OUTPATIENT)
Dept: ORTHOPEDIC SURGERY | Facility: CLINIC | Age: 64
End: 2024-05-14
Payer: SUBSIDIZED

## 2024-05-14 DIAGNOSIS — M17.12 UNILATERAL PRIMARY OSTEOARTHRITIS, LEFT KNEE: ICD-10-CM

## 2024-05-14 PROCEDURE — 99214 OFFICE O/P EST MOD 30 MIN: CPT

## 2024-05-14 NOTE — DISCUSSION/SUMMARY
[de-identified] : Patient here for qualification visit of left knee for Organogenesis Randomized trial for Amniotic suspension allograft. All appropriate qualification exams performed. x-rays, WOMAC score and other tests sent appropriately  Will return for study if qualified and approved.

## 2024-05-14 NOTE — PHYSICAL EXAM
[de-identified] :  Constitutional:  no acute distress, well nourished, well developed, well-appearing and no acute distress, well nourished, well developed and well-appearing.   Eyes:  normal sclera/conjunctiva, pupils equal round and reactive to light, extraocular movements intact and normal sclera/conjunctiva, pupils are equal, round and reactive to light and extraocular movements are intact.   ENT:  the outer ears and nose were normal in appearance and the oropharynx was normal.   Neck:  no jugular venous distention, no lymphadenopathy, supple, the thyroid was normal and there were no nodules present and no jugular venous distention, supple, no lymphadenopathy and the thyroid was normal and there were no nodules present.   Pulmonary:  no respiratory distress, no accessory muscle use, lungs were clear to auscultation bilaterally, no respiratory distress, lungs were clear to auscultation bilaterally and no accessory muscle use.   Cardiac:  normal rate, with a regular rhythm, normal S1 and S2, no murmur heard and normal rate, regular rhythm, normal S1 and S2 and no murmur heard.   Vascular:  no carotid bruits, no varicosities, there was no peripheral edema and no extremity clubbing/cyanosis.   Abdomen:  abdomen soft, non-tender, non-distended, no abdominal mass palpated, no HSM and normal bowel sounds.   Back:  no CVA tenderness and no spinal tenderness.   Musculoskeletal:  Left knee medial and lateral joint line tenderness no effusion today.   Skin:  no rash and no rash.   Neurology:  coordination grossly intact, no focal deficits, normal gait, deep tendon reflexes were 2+ and symmetric and normal gait, coordination grossly intact, no focal deficits and deep tendon reflexes were 2+ and symmetric.   Psychiatric:  the affect was normal, insight and judgment were intact and affect was normal and insight and judgment were intact. [de-identified] :  X-rays in 4 views of bilateral knees performed today and personally reviewed by me demonstrates KL 4 OA

## 2024-05-14 NOTE — HISTORY OF PRESENT ILLNESS
[Pain Location] : pain [] : left knee [7] : a current pain level of 7/10 [de-identified] : 63 y/o F present today for qualification visit for the Organogenesis RC study for ASA for left knee OA.

## 2024-05-15 ENCOUNTER — APPOINTMENT (OUTPATIENT)
Dept: ORTHOPEDIC SURGERY | Facility: CLINIC | Age: 64
End: 2024-05-15

## 2024-06-04 ENCOUNTER — TRANSCRIPTION ENCOUNTER (OUTPATIENT)
Age: 64
End: 2024-06-04

## 2024-06-17 ENCOUNTER — TRANSCRIPTION ENCOUNTER (OUTPATIENT)
Age: 64
End: 2024-06-17

## 2024-06-18 ENCOUNTER — TRANSCRIPTION ENCOUNTER (OUTPATIENT)
Age: 64
End: 2024-06-18

## 2024-06-25 ENCOUNTER — TRANSCRIPTION ENCOUNTER (OUTPATIENT)
Age: 64
End: 2024-06-25

## 2024-06-26 ENCOUNTER — TRANSCRIPTION ENCOUNTER (OUTPATIENT)
Age: 64
End: 2024-06-26

## 2024-06-27 ENCOUNTER — TRANSCRIPTION ENCOUNTER (OUTPATIENT)
Age: 64
End: 2024-06-27

## 2024-06-28 ENCOUNTER — TRANSCRIPTION ENCOUNTER (OUTPATIENT)
Age: 64
End: 2024-06-28

## 2024-06-29 ENCOUNTER — RX RENEWAL (OUTPATIENT)
Age: 64
End: 2024-06-29

## 2024-07-08 ENCOUNTER — APPOINTMENT (OUTPATIENT)
Dept: ORTHOPEDIC SURGERY | Facility: CLINIC | Age: 64
End: 2024-07-08
Payer: MEDICARE

## 2024-07-08 PROCEDURE — 20610 DRAIN/INJ JOINT/BURSA W/O US: CPT | Mod: 50

## 2024-07-15 ENCOUNTER — APPOINTMENT (OUTPATIENT)
Dept: ORTHOPEDIC SURGERY | Facility: CLINIC | Age: 64
End: 2024-07-15
Payer: MEDICARE

## 2024-07-15 PROCEDURE — 20610 DRAIN/INJ JOINT/BURSA W/O US: CPT | Mod: 50

## 2024-07-22 ENCOUNTER — APPOINTMENT (OUTPATIENT)
Dept: ORTHOPEDIC SURGERY | Facility: CLINIC | Age: 64
End: 2024-07-22
Payer: MEDICARE

## 2024-07-22 DIAGNOSIS — H65.191 OTHER ACUTE NONSUPPURATIVE OTITIS MEDIA, RIGHT EAR: ICD-10-CM

## 2024-07-22 DIAGNOSIS — H66.011 ACUTE SUPPURATIVE OTITIS MEDIA WITH SPONTANEOUS RUPTURE OF EAR DRUM, RIGHT EAR: ICD-10-CM

## 2024-07-22 PROCEDURE — 20610 DRAIN/INJ JOINT/BURSA W/O US: CPT | Mod: 50

## 2024-07-22 RX ORDER — AMOXICILLIN 500 MG/1
500 CAPSULE ORAL 3 TIMES DAILY
Qty: 21 | Refills: 0 | Status: ACTIVE | COMMUNITY
Start: 2024-07-22 | End: 1900-01-01

## 2024-07-22 NOTE — PROCEDURE
[de-identified] :     The left knee was prepped with alcohol and ethyl chloride was used to numb the skin. 1.5 cc of xylocaine 1% was placed for local anaesthesia. An injection of euflexxa(20mg/2ml) was then given without complication into the joint. Patient instructed that there may be an inflammatory flare for 24 hrs , to use ice or advil if needed.  lot#[id number] EXP#2025-5-27   The right knee was prepped with alcohol and ethyl chloride was used to numb the skin. 1.5 cc of xylocaine 1% was placed for local anaesthesia. An injection of euflexxa(20mg/2ml) was then given without complication into the joint. Patient instructed that there may be an inflammatory flare for 24 hrs , to use ice or advil if needed.  lot#[id number] EXP#2025-5-27.

## 2024-09-25 ENCOUNTER — TRANSCRIPTION ENCOUNTER (OUTPATIENT)
Age: 64
End: 2024-09-25

## 2024-09-27 ENCOUNTER — RX RENEWAL (OUTPATIENT)
Age: 64
End: 2024-09-27

## 2024-09-30 ENCOUNTER — TRANSCRIPTION ENCOUNTER (OUTPATIENT)
Age: 64
End: 2024-09-30

## 2024-10-09 ENCOUNTER — TRANSCRIPTION ENCOUNTER (OUTPATIENT)
Age: 64
End: 2024-10-09

## 2024-10-20 PROBLEM — H93.299 ABNORMAL AUDITORY PERCEPTION: Status: RESOLVED | Noted: 2023-03-13 | Resolved: 2024-10-20

## 2024-10-21 ENCOUNTER — APPOINTMENT (OUTPATIENT)
Dept: HEART AND VASCULAR | Facility: CLINIC | Age: 64
End: 2024-10-21
Payer: MEDICARE

## 2024-10-21 ENCOUNTER — NON-APPOINTMENT (OUTPATIENT)
Age: 64
End: 2024-10-21

## 2024-10-21 ENCOUNTER — APPOINTMENT (OUTPATIENT)
Dept: INTERNAL MEDICINE | Facility: CLINIC | Age: 64
End: 2024-10-21
Payer: MEDICARE

## 2024-10-21 VITALS
HEIGHT: 65 IN | WEIGHT: 165 LBS | TEMPERATURE: 97.4 F | OXYGEN SATURATION: 96 % | HEART RATE: 98 BPM | SYSTOLIC BLOOD PRESSURE: 138 MMHG | DIASTOLIC BLOOD PRESSURE: 84 MMHG | BODY MASS INDEX: 27.49 KG/M2

## 2024-10-21 VITALS
SYSTOLIC BLOOD PRESSURE: 132 MMHG | TEMPERATURE: 97.6 F | DIASTOLIC BLOOD PRESSURE: 81 MMHG | HEIGHT: 65 IN | BODY MASS INDEX: 27.49 KG/M2 | WEIGHT: 165 LBS | OXYGEN SATURATION: 95 % | HEART RATE: 95 BPM

## 2024-10-21 DIAGNOSIS — Z00.00 ENCOUNTER FOR GENERAL ADULT MEDICAL EXAMINATION W/OUT ABNORMAL FINDINGS: ICD-10-CM

## 2024-10-21 DIAGNOSIS — M19.90 UNSPECIFIED OSTEOARTHRITIS, UNSPECIFIED SITE: ICD-10-CM

## 2024-10-21 DIAGNOSIS — Z87.39 PERSONAL HISTORY OF OTHER DISEASES OF THE MUSCULOSKELETAL SYSTEM AND CONNECTIVE TISSUE: ICD-10-CM

## 2024-10-21 DIAGNOSIS — R42 DIZZINESS AND GIDDINESS: ICD-10-CM

## 2024-10-21 DIAGNOSIS — H93.299 OTHER ABNORMAL AUDITORY PERCEPTIONS, UNSPECIFIED EAR: ICD-10-CM

## 2024-10-21 DIAGNOSIS — R73.03 PREDIABETES.: ICD-10-CM

## 2024-10-21 DIAGNOSIS — E66.3 OVERWEIGHT: ICD-10-CM

## 2024-10-21 DIAGNOSIS — Z11.59 ENCOUNTER FOR SCREENING FOR OTHER VIRAL DISEASES: ICD-10-CM

## 2024-10-21 DIAGNOSIS — E78.5 HYPERLIPIDEMIA, UNSPECIFIED: ICD-10-CM

## 2024-10-21 DIAGNOSIS — Z11.4 ENCOUNTER FOR SCREENING FOR HUMAN IMMUNODEFICIENCY VIRUS [HIV]: ICD-10-CM

## 2024-10-21 DIAGNOSIS — I10 ESSENTIAL (PRIMARY) HYPERTENSION: ICD-10-CM

## 2024-10-21 PROCEDURE — 99214 OFFICE O/P EST MOD 30 MIN: CPT

## 2024-10-21 PROCEDURE — 36415 COLL VENOUS BLD VENIPUNCTURE: CPT

## 2024-10-21 PROCEDURE — G2211 COMPLEX E/M VISIT ADD ON: CPT

## 2024-10-21 PROCEDURE — G0439: CPT | Mod: GC

## 2024-10-21 PROCEDURE — 93000 ELECTROCARDIOGRAM COMPLETE: CPT

## 2024-10-21 PROCEDURE — G0444 DEPRESSION SCREEN ANNUAL: CPT | Mod: 59

## 2024-10-21 RX ORDER — ACETAMINOPHEN 500 MG
1200-1000 TABLET ORAL
Qty: 90 | Refills: 3 | Status: ACTIVE | COMMUNITY
Start: 2024-10-21 | End: 1900-01-01

## 2024-10-24 LAB
25(OH)D3 SERPL-MCNC: 68.9 NG/ML
ALBUMIN SERPL ELPH-MCNC: 4.5 G/DL
ALP BLD-CCNC: 55 U/L
ALT SERPL-CCNC: 12 U/L
ANION GAP SERPL CALC-SCNC: 13 MMOL/L
AST SERPL-CCNC: 14 U/L
BILIRUB SERPL-MCNC: 0.4 MG/DL
BUN SERPL-MCNC: 14 MG/DL
CALCIUM SERPL-MCNC: 9.6 MG/DL
CHLORIDE SERPL-SCNC: 104 MMOL/L
CHOLEST SERPL-MCNC: 274 MG/DL
CO2 SERPL-SCNC: 22 MMOL/L
CREAT SERPL-MCNC: 0.61 MG/DL
CREAT SPEC-SCNC: 98 MG/DL
EGFR: 99 ML/MIN/1.73M2
ESTIMATED AVERAGE GLUCOSE: 111 MG/DL
GLUCOSE SERPL-MCNC: 99 MG/DL
HBA1C MFR BLD HPLC: 5.5 %
HCV AB SER QL: NONREACTIVE
HCV S/CO RATIO: 0.08 S/CO
HDLC SERPL-MCNC: 93 MG/DL
HIV1+2 AB SPEC QL IA.RAPID: NONREACTIVE
LDLC SERPL CALC-MCNC: 171 MG/DL
MICROALBUMIN 24H UR DL<=1MG/L-MCNC: <1.2 MG/DL
MICROALBUMIN/CREAT 24H UR-RTO: NORMAL MG/G
NONHDLC SERPL-MCNC: 181 MG/DL
POTASSIUM SERPL-SCNC: 4.1 MMOL/L
PROT SERPL-MCNC: 7 G/DL
SODIUM SERPL-SCNC: 140 MMOL/L
TRIGL SERPL-MCNC: 65 MG/DL

## 2024-11-11 ENCOUNTER — TRANSCRIPTION ENCOUNTER (OUTPATIENT)
Age: 64
End: 2024-11-11

## 2024-11-20 ENCOUNTER — APPOINTMENT (OUTPATIENT)
Dept: DERMATOLOGY | Facility: CLINIC | Age: 64
End: 2024-11-20

## 2024-12-02 ENCOUNTER — APPOINTMENT (OUTPATIENT)
Dept: OPHTHALMOLOGY | Facility: CLINIC | Age: 64
End: 2024-12-02
Payer: MEDICARE

## 2024-12-02 ENCOUNTER — APPOINTMENT (OUTPATIENT)
Dept: INTERNAL MEDICINE | Facility: CLINIC | Age: 64
End: 2024-12-02
Payer: MEDICARE

## 2024-12-02 ENCOUNTER — NON-APPOINTMENT (OUTPATIENT)
Age: 64
End: 2024-12-02

## 2024-12-02 VITALS
DIASTOLIC BLOOD PRESSURE: 72 MMHG | BODY MASS INDEX: 27.31 KG/M2 | SYSTOLIC BLOOD PRESSURE: 111 MMHG | HEART RATE: 82 BPM | WEIGHT: 160 LBS | HEIGHT: 64 IN | TEMPERATURE: 98.2 F | OXYGEN SATURATION: 97 %

## 2024-12-02 DIAGNOSIS — M85.80 OTHER SPECIFIED DISORDERS OF BONE DENSITY AND STRUCTURE, UNSPECIFIED SITE: ICD-10-CM

## 2024-12-02 DIAGNOSIS — I10 ESSENTIAL (PRIMARY) HYPERTENSION: ICD-10-CM

## 2024-12-02 DIAGNOSIS — E78.5 HYPERLIPIDEMIA, UNSPECIFIED: ICD-10-CM

## 2024-12-02 PROCEDURE — 92002 INTRM OPH EXAM NEW PATIENT: CPT

## 2024-12-02 PROCEDURE — 99214 OFFICE O/P EST MOD 30 MIN: CPT

## 2024-12-02 PROCEDURE — 92250 FUNDUS PHOTOGRAPHY W/I&R: CPT

## 2024-12-02 PROCEDURE — G2211 COMPLEX E/M VISIT ADD ON: CPT

## 2024-12-02 PROCEDURE — 36415 COLL VENOUS BLD VENIPUNCTURE: CPT

## 2024-12-04 ENCOUNTER — TRANSCRIPTION ENCOUNTER (OUTPATIENT)
Age: 64
End: 2024-12-04

## 2024-12-04 LAB
CHOLEST SERPL-MCNC: 250 MG/DL
HDLC SERPL-MCNC: 84 MG/DL
LDLC SERPL CALC-MCNC: 148 MG/DL
NONHDLC SERPL-MCNC: 166 MG/DL
TRIGL SERPL-MCNC: 104 MG/DL

## 2024-12-23 ENCOUNTER — RX RENEWAL (OUTPATIENT)
Age: 64
End: 2024-12-23

## 2025-01-09 ENCOUNTER — TRANSCRIPTION ENCOUNTER (OUTPATIENT)
Age: 65
End: 2025-01-09

## 2025-02-12 ENCOUNTER — TRANSCRIPTION ENCOUNTER (OUTPATIENT)
Age: 65
End: 2025-02-12

## 2025-03-03 ENCOUNTER — EMERGENCY (EMERGENCY)
Facility: HOSPITAL | Age: 65
LOS: 1 days | Discharge: HOME | End: 2025-03-03
Admitting: EMERGENCY MEDICINE
Payer: MEDICARE

## 2025-03-03 VITALS
SYSTOLIC BLOOD PRESSURE: 169 MMHG | DIASTOLIC BLOOD PRESSURE: 81 MMHG | HEART RATE: 83 BPM | RESPIRATION RATE: 16 BRPM | TEMPERATURE: 98 F | OXYGEN SATURATION: 98 % | WEIGHT: 158.95 LBS

## 2025-03-03 PROCEDURE — 72131 CT LUMBAR SPINE W/O DYE: CPT | Mod: MC

## 2025-03-03 PROCEDURE — 72131 CT LUMBAR SPINE W/O DYE: CPT | Mod: 26

## 2025-03-03 PROCEDURE — 99284 EMERGENCY DEPT VISIT MOD MDM: CPT

## 2025-03-03 PROCEDURE — 99284 EMERGENCY DEPT VISIT MOD MDM: CPT | Mod: 25

## 2025-03-03 RX ORDER — METHOCARBAMOL 500 MG/1
1000 TABLET, FILM COATED ORAL ONCE
Refills: 0 | Status: COMPLETED | OUTPATIENT
Start: 2025-03-03 | End: 2025-03-03

## 2025-03-03 RX ORDER — LIDOCAINE HYDROCHLORIDE 20 MG/ML
1 JELLY TOPICAL ONCE
Refills: 0 | Status: COMPLETED | OUTPATIENT
Start: 2025-03-03 | End: 2025-03-03

## 2025-03-03 RX ORDER — LIDOCAINE HYDROCHLORIDE 20 MG/ML
1 JELLY TOPICAL
Qty: 2 | Refills: 0
Start: 2025-03-03 | End: 2025-03-04

## 2025-03-03 RX ORDER — KETOROLAC TROMETHAMINE 30 MG/ML
30 INJECTION, SOLUTION INTRAMUSCULAR; INTRAVENOUS ONCE
Refills: 0 | Status: DISCONTINUED | OUTPATIENT
Start: 2025-03-03 | End: 2025-03-03

## 2025-03-03 RX ORDER — IBUPROFEN 200 MG
1 TABLET ORAL
Qty: 20 | Refills: 0
Start: 2025-03-03 | End: 2025-03-07

## 2025-03-03 RX ORDER — METHOCARBAMOL 500 MG/1
2 TABLET, FILM COATED ORAL
Qty: 18 | Refills: 0
Start: 2025-03-03 | End: 2025-03-05

## 2025-03-03 RX ADMIN — METHOCARBAMOL 1000 MILLIGRAM(S): 500 TABLET, FILM COATED ORAL at 10:56

## 2025-03-03 RX ADMIN — LIDOCAINE HYDROCHLORIDE 1 PATCH: 20 JELLY TOPICAL at 10:56

## 2025-03-03 NOTE — ED PROVIDER NOTE - PATIENT PORTAL LINK FT
You can access the FollowMyHealth Patient Portal offered by [facility] by registering at the following website: [email or web address]. By joining [employer]’s FollowMyHealth portal, you will also be able to view your health information using other applications (apps) compatible with our system.

## 2025-03-03 NOTE — ED PROVIDER NOTE - PHYSICAL EXAMINATION
CONSTITUTIONAL: Well-appearing; well-nourished; in no apparent distress.   HEAD: Normocephalic; atraumatic.   NECK: Supple; non-tender;   CARDIOVASCULAR: Normal S1, S2; no murmurs, rubs, or gallops. Regular rate and rhythm.   RESPIRATORY: Breathing easily; breath sounds clear and equal bilaterally; no wheezes, rhonchi, or rales.  MSK: FROM at all extremities,   Back: +ttp to L lumbar paraspinal muscles, neg SLR   Neuro: CN 2-12 intact, normal finger to nose, normal gait, strength normal

## 2025-03-03 NOTE — ED ADULT TRIAGE NOTE - CHIEF COMPLAINT QUOTE
LBP [id number], states she was going to see her orthopedic MD but they messed up her with her appt time.

## 2025-03-03 NOTE — ED ADULT NURSE NOTE - OBJECTIVE STATEMENT
Patient to ED c/o lower back pain radiating down RLE x 9 weeks, denies known injury or trauma. Ambulatory, AAOX4, NAD.

## 2025-03-03 NOTE — ED ADULT NURSE NOTE - CAS ELECT INFOMATION PROVIDED
RN provided and reviewed DC papers with pt. Pt verbalized understanding and reported no further questions. Pt ambulated out of ED with steady gait./DC instructions

## 2025-03-03 NOTE — ED ADULT NURSE NOTE - CHIEF COMPLAINT QUOTE
LBP [id number], states she was going to see her orthopedic MD but they messed up her with her appt time.

## 2025-03-03 NOTE — ED PROVIDER NOTE - OBJECTIVE STATEMENT
64 yo F with pmh of HTN c/o low back pain x 9 weeks. Pain across her low back, worse in the lower left. Sometimes feels tingling into her L groin. Pain worse with walking. Denies trauma. States she is generally very active. Has arthritis in her knees so she initially thought maybe she was compensating. States after a couple of blocks she has to stop. Has been taking tylenol with no relief. Denies weakness, incontinence, numbness, fever, chills, abd pain.

## 2025-03-03 NOTE — ED PROVIDER NOTE - NSFOLLOWUPINSTRUCTIONS_ED_ALL_ED_FT
Can take tylenol 650mg AND/OR motrin 600mg (May cause stomach issues - take with food and avoid prolonged use) every 6hrs as needed for pain.    Follow up with primary doctor within 1-2 days.    Can take robaxin (methocarbamol) as prescribed as needed for pain/spasm.    Return to ER immediately for fevers, persistent vomit, uncontrolled pain, incontinence, focal weakness/numbness, worsening dizziness.     Follow up with spine specialist for persistent pain.   Can call [phone] [facility]- ([phone]) to schedule appointment or go online [email or web address]    Back Pain    Back pain is very common in adults. The cause of back pain is rarely dangerous and the pain often gets better over time. The cause of your back pain may not be known and may include strain of muscles or ligaments, degeneration of the spinal disks, or arthritis. Occasionally the pain may radiate down your leg(s). Over-the-counter medicines to reduce pain and inflammation are often the most helpful. Stretching and remaining active frequently helps the healing process.     SEEK IMMEDIATE MEDICAL CARE IF YOU HAVE ANY OF THE FOLLOWING SYMPTOMS: bowel or bladder control problems, unusual weakness or numbness in your arms or legs, nausea or vomiting, abdominal pain, fever, dizziness/lightheadedness.

## 2025-03-03 NOTE — ED PROVIDER NOTE - CLINICAL SUMMARY MEDICAL DECISION MAKING FREE TEXT BOX
66 yo F with pmh of HTN c/o low back pain x 9 weeks. Pain across her low back, worse in the lower left. Sometimes feels tingling into her L groin. Pain worse with walking. Denies trauma. States she is generally very active. Has arthritis in her knees so she initially thought maybe she was compensating. States after a couple of blocks she has to stop. Has been taking tylenol with no relief. Denies weakness, incontinence, numbness, fever, chills, abd pain.+ttp to L lumbar paraspinal muscles, neg SLR. 66 yo F with pmh of HTN c/o low back pain x 9 weeks. Pain across her low back, worse in the lower left. Sometimes feels tingling into her L groin. Pain worse with walking. Denies trauma. States she is generally very active. Has arthritis in her knees so she initially thought maybe she was compensating. States after a couple of blocks she has to stop. Has been taking tylenol with no relief. Denies weakness, incontinence, numbness, fever, chills, abd pain.+ttp to L lumbar paraspinal muscles, neg SLR. CT shows multilevel DDD with spinal stenosis. Pt has appt with ortho next week

## 2025-03-05 DIAGNOSIS — M48.061 SPINAL STENOSIS, LUMBAR REGION WITHOUT NEUROGENIC CLAUDICATION: ICD-10-CM

## 2025-03-05 DIAGNOSIS — M17.0 BILATERAL PRIMARY OSTEOARTHRITIS OF KNEE: ICD-10-CM

## 2025-03-05 DIAGNOSIS — I10 ESSENTIAL (PRIMARY) HYPERTENSION: ICD-10-CM

## 2025-03-05 DIAGNOSIS — M54.50 LOW BACK PAIN, UNSPECIFIED: ICD-10-CM

## 2025-03-12 ENCOUNTER — APPOINTMENT (OUTPATIENT)
Dept: ORTHOPEDIC SURGERY | Facility: CLINIC | Age: 65
End: 2025-03-12
Payer: MEDICARE

## 2025-03-12 VITALS
BODY MASS INDEX: 26.49 KG/M2 | WEIGHT: 159 LBS | HEIGHT: 65 IN | SYSTOLIC BLOOD PRESSURE: 146 MMHG | OXYGEN SATURATION: 98 % | HEART RATE: 81 BPM | DIASTOLIC BLOOD PRESSURE: 82 MMHG | TEMPERATURE: 97.2 F

## 2025-03-12 DIAGNOSIS — Z01.818 ENCOUNTER FOR OTHER PREPROCEDURAL EXAMINATION: ICD-10-CM

## 2025-03-12 DIAGNOSIS — M48.061 SPINAL STENOSIS, LUMBAR REGION WITHOUT NEUROGENIC CLAUDICATION: ICD-10-CM

## 2025-03-12 DIAGNOSIS — M51.369: ICD-10-CM

## 2025-03-12 PROCEDURE — 99214 OFFICE O/P EST MOD 30 MIN: CPT

## 2025-03-12 PROCEDURE — 72083 X-RAY EXAM ENTIRE SPI 4/5 VW: CPT

## 2025-03-12 RX ORDER — METHOCARBAMOL 500 MG/1
500 TABLET, FILM COATED ORAL EVERY 8 HOURS
Qty: 60 | Refills: 1 | Status: ACTIVE | COMMUNITY
Start: 2025-03-12 | End: 1900-01-01

## 2025-03-26 ENCOUNTER — RX RENEWAL (OUTPATIENT)
Age: 65
End: 2025-03-26

## 2025-04-21 ENCOUNTER — APPOINTMENT (OUTPATIENT)
Dept: HEART AND VASCULAR | Facility: CLINIC | Age: 65
End: 2025-04-21

## 2025-06-02 ENCOUNTER — APPOINTMENT (OUTPATIENT)
Dept: MAMMOGRAPHY | Facility: CLINIC | Age: 65
End: 2025-06-02

## 2025-06-30 ENCOUNTER — RX RENEWAL (OUTPATIENT)
Age: 65
End: 2025-06-30

## 2025-08-21 ENCOUNTER — TRANSCRIPTION ENCOUNTER (OUTPATIENT)
Age: 65
End: 2025-08-21

## 2025-09-02 ENCOUNTER — TRANSCRIPTION ENCOUNTER (OUTPATIENT)
Age: 65
End: 2025-09-02

## 2025-09-03 ENCOUNTER — TRANSCRIPTION ENCOUNTER (OUTPATIENT)
Age: 65
End: 2025-09-03

## 2025-09-03 RX ORDER — METHOCARBAMOL 500 MG/1
500 TABLET, FILM COATED ORAL 3 TIMES DAILY
Qty: 42 | Refills: 0 | Status: ACTIVE | COMMUNITY
Start: 2025-09-03 | End: 1900-01-01